# Patient Record
Sex: MALE | Race: WHITE | HISPANIC OR LATINO | Employment: FULL TIME | URBAN - METROPOLITAN AREA
[De-identification: names, ages, dates, MRNs, and addresses within clinical notes are randomized per-mention and may not be internally consistent; named-entity substitution may affect disease eponyms.]

---

## 2017-10-10 ENCOUNTER — APPOINTMENT (EMERGENCY)
Dept: RADIOLOGY | Facility: HOSPITAL | Age: 27
End: 2017-10-10

## 2017-10-10 ENCOUNTER — HOSPITAL ENCOUNTER (EMERGENCY)
Facility: HOSPITAL | Age: 27
Discharge: HOME | End: 2017-10-10
Attending: EMERGENCY MEDICINE

## 2017-10-10 VITALS
BODY MASS INDEX: 27.74 KG/M2 | WEIGHT: 183 LBS | OXYGEN SATURATION: 97 % | SYSTOLIC BLOOD PRESSURE: 113 MMHG | DIASTOLIC BLOOD PRESSURE: 75 MMHG | RESPIRATION RATE: 16 BRPM | TEMPERATURE: 98.2 F | HEART RATE: 75 BPM | HEIGHT: 68 IN

## 2017-10-10 DIAGNOSIS — R07.81 RIB PAIN ON LEFT SIDE: Primary | ICD-10-CM

## 2017-10-10 DIAGNOSIS — D17.1 LIPOMA OF TORSO: ICD-10-CM

## 2017-10-10 DIAGNOSIS — M54.50 ACUTE LEFT-SIDED LOW BACK PAIN WITHOUT SCIATICA: ICD-10-CM

## 2017-10-10 PROCEDURE — 71101 X-RAY EXAM UNILAT RIBS/CHEST: CPT

## 2017-10-10 PROCEDURE — 99283 EMERGENCY DEPT VISIT LOW MDM: CPT

## 2017-10-10 RX ORDER — NAPROXEN 250 MG/1
500 TABLET ORAL ONCE
Status: COMPLETED | OUTPATIENT
Start: 2017-10-10 | End: 2017-10-10

## 2017-10-10 RX ORDER — NAPROXEN 500 MG/1
500 TABLET ORAL 2 TIMES DAILY PRN
Qty: 20 TABLET | Refills: 0 | Status: SHIPPED | OUTPATIENT
Start: 2017-10-10 | End: 2018-03-28

## 2017-10-10 RX ADMIN — NAPROXEN 500 MG: 250 TABLET ORAL at 14:14

## 2017-10-10 NOTE — ED PROVIDER NOTES
History  Chief Complaint   Patient presents with    Back Pain     left sided back pain that worsens with movement and position changes x 1 week  pt also has a soft lump to the area below the right axilla       History provided by:  Patient and relative  Back Pain   Location:  Lumbar spine (left flankm)  Quality:  Aching  Radiates to:  Does not radiate  Pain severity:  Moderate  Pain is:  Same all the time  Onset quality:  Gradual  Duration:  1 week  Timing:  Constant  Progression:  Worsening  Chronicity:  New  Context comment:  1 week ago, was drinking, unsure what happened, next morning back hurt, has been hurting since  Relieved by:  Lying down and being still  Worsened by:  Twisting and bending  Ineffective treatments:  None tried  Associated symptoms: no abdominal pain, no chest pain, no dysuria, no fever, no headaches, no leg pain, no numbness, no tingling and no weakness    Associated symptoms comment:  Noticed a "lump" over right flank  Non tender, freely mobile        Prior to Admission Medications   Prescriptions Last Dose Informant Patient Reported? Taking? butalbital-acetaminophen-caffeine (FIORICET) -40 mg per tablet   No No   Sig: Take 1 tablet by mouth every 4 (four) hours as needed for headaches for up to 20 doses  Facility-Administered Medications: None       History reviewed  No pertinent past medical history  History reviewed  No pertinent surgical history  History reviewed  No pertinent family history  I have reviewed and agree with the history as documented  Social History   Substance Use Topics    Smoking status: Current Every Day Smoker     Packs/day: 0 25     Types: Cigarettes    Smokeless tobacco: Never Used    Alcohol use Yes      Comment: occasional        Review of Systems   Constitutional: Negative for activity change, chills, diaphoresis and fever  HENT: Negative for congestion, sinus pressure and sore throat      Eyes: Negative for pain and visual disturbance  Respiratory: Negative for cough, chest tightness, shortness of breath, wheezing and stridor  Cardiovascular: Negative for chest pain and palpitations  Gastrointestinal: Negative for abdominal distention, abdominal pain, constipation, diarrhea, nausea and vomiting  Genitourinary: Negative for dysuria and frequency  Musculoskeletal: Positive for back pain  Negative for neck pain and neck stiffness  Skin: Negative for rash  Neurological: Negative for dizziness, tingling, speech difficulty, weakness, light-headedness, numbness and headaches  Physical Exam  ED Triage Vitals   Temperature Pulse Respirations Blood Pressure SpO2   10/10/17 1350 10/10/17 1348 10/10/17 1348 10/10/17 1348 10/10/17 1348   98 2 °F (36 8 °C) 75 16 113/75 97 %      Temp Source Heart Rate Source Patient Position - Orthostatic VS BP Location FiO2 (%)   10/10/17 1348 10/10/17 1348 10/10/17 1348 10/10/17 1348 --   Oral Monitor Sitting Right arm       Pain Score       10/10/17 1348       7           Physical Exam   Constitutional: He is oriented to person, place, and time  He appears well-developed  No distress  HENT:   Head: Normocephalic and atraumatic  Eyes: Pupils are equal, round, and reactive to light  Neck: Normal range of motion  Neck supple  No tracheal deviation present  Cardiovascular: Normal rate, regular rhythm, normal heart sounds and intact distal pulses  No murmur heard  Pulmonary/Chest: Effort normal and breath sounds normal  No stridor  No respiratory distress  Abdominal: Soft  He exhibits no distension  There is no tenderness  There is no rebound and no guarding  Musculoskeletal: Normal range of motion  left flank mild tenderness, mild overlying contusion, rib tenderness along ribs 9-11    On patient's right flank he has a freely mobile fatty subcutaneous mass consistent with a lipoma, nontender no overlying skin changes   Neurological: He is alert and oriented to person, place, and time    Skin: Skin is warm and dry  He is not diaphoretic  No erythema  No pallor  Psychiatric: He has a normal mood and affect  Vitals reviewed  ED Medications  Medications   naproxen (NAPROSYN) tablet 500 mg (500 mg Oral Given 10/10/17 1414)       Diagnostic Studies  Labs Reviewed - No data to display    XR ribs with pa chest min 3 views LEFT   ED Interpretation   No acute pathology          Procedures  Procedures      Phone Contacts  ED Phone Contact    ED Course  ED Course                                MDM  Number of Diagnoses or Management Options  Acute left-sided low back pain without sciatica: new and requires workup  Rib pain on left side: new and requires workup  Diagnosis management comments: 72-year-old male lower back pain left-sided rib pain, started after a presumed fall after intoxicated night about 1 week ago  Does not fully recall the events due to alcohol intoxication, mild overlying contusion on left flank, no abdominal tenderness to deep palpation, doubt serious solid organs and intra-abdominal injury, will start with x-rays of the left ribs, will treat with NSAIDs  As per the lipoma discovered on physical examination, is likely has been there for much longer than the patient has realized, freely mobile no evidence of more sinister pathology than just a simple lipoma will have him follow with outpatient surgery for discussion of potential removal       Amount and/or Complexity of Data Reviewed  Tests in the radiology section of CPT®: ordered and reviewed  Review and summarize past medical records: yes  Independent visualization of images, tracings, or specimens: yes      CritCare Time    Disposition  Final diagnoses:   Rib pain on left side   Acute left-sided low back pain without sciatica   Lipoma of torso - Right flank     ED Disposition     ED Disposition Condition Comment    Discharge  Maykel Ortiz discharge to home/self care      Condition at discharge: Good        Follow-up Information     Follow up With Specialties Details Why [address], DO General Surgery Call in 1 day To arrange for the next available appointment to discuss possible removal of lipoma if indicated Erin CORADO  [address] [place] [address]          Patient's Medications   Discharge Prescriptions    NAPROXEN (NAPROSYN) 500 MG TABLET    Take 1 tablet by mouth 2 (two) times a day as needed for mild pain       Start Date: 10/10/2017End Date: --       Order Dose: 500 mg       Quantity: 20 tablet    Refills: 0     No discharge procedures on file      ED Provider  Electronically Signed by       Fauzia Gaxiola DO  10/10/17 100 Geo Soriano DO  10/10/17 [id number]

## 2017-10-10 NOTE — DISCHARGE INSTRUCTIONS
Lipoma   GENERAL INFORMATION:   What is a lipoma? A lipoma is a lump made up of fat cells  It is most often found just under your skin on your shoulders, back, or neck, but can be found in other areas of your body  Multiple lipomas found in different areas of your body is called lipomatosis  Lipomas normally grow very slowly and rarely turn into cancer  What increases my risk of a lipoma? The exact cause of a lipoma is not known  Single lipomas are more common in women  Men are more likely to have lipomatosis  The following may increase your risk of getting a lipoma or lipomatosis:  · Family history of lipoma     · Certain medical conditions, such as liver disease, or problems controlling your blood sugar    · Obesity    · A blunt blow or injury to your body  What are the signs and symptoms of a lipoma? Lipomas can occur anywhere in your body and cause signs and symptoms depending on where they occur  Most often, you have a soft, round, movable lump just under your skin  The lump may be painful, but this is not common  How is a lipoma diagnosed? Your caregiver will examine you  He may feel the area around your lipoma  Tell your caregiver about any signs and symptoms you have  You may need any of the following:  · Ultrasound: An ultrasound uses sound waves to show pictures on a monitor  An ultrasound may be done to look at your lipoma and surrounding tissue  · X-ray:  An x-ray may be taken to check for lipomas in your muscles and other areas of your body  · CT scan: This test is also called a CAT scan  An x-ray machine uses a computer to take pictures of your lipoma and nearby tissue  You may be given a dye before the pictures are taken to help caregivers see the pictures better  Tell the caregiver if you have ever had an allergic reaction to contrast dye  · MRI:  This scan uses powerful magnets and a computer to take pictures of your lipoma and nearby tissue   You may be given dye to help the pictures show up better  Tell the caregiver if you have ever had an allergic reaction to contrast dye  Do not enter the MRI room with anything metal  Metal can cause serious injury  Tell the caregiver if you have any metal in or on your body  · Biopsy:  During this procedure, a small amount of tissue is removed from your lipoma  The tissue sample will be sent to a lab for tests  How is a lipoma treated? You may need treatment if your lipoma grows and causes symptoms such as pain  You may choose to have your lipoma treated if you do not like how it looks  Treatment may include any of the following:  · Steroid injections: This is given as a shot into your lipoma to help it shrink  · Liposuction:  During this procedure, your caregiver will use a syringe with a needle to remove your lipoma  He may also insert a scope and tools through a small incision  A scope is a thin, flexible tube with a light and tiny camera on the end  This will help him see and remove your lipoma  · Surgical removal:  Your caregiver will remove your lipoma through an incision in your skin  Anesthesia medicine will be used to numb the surgery site  A drain may be put into your skin to remove extra blood or fluid from your surgery area  The incision may be closed with stitches and a bandage may cover your wound  What are the risks of a lipoma? · Treatment for your lipoma may cause pain, swelling, and bruising  Liposuction may cause dimpling or color changes in your skin  Surgical removal of your lipoma may cause a scar  With surgery, a seroma (pocket of fluid) may form in nearby tissue or organs  Your nerves may be damaged and cause numbness or tingling in your skin  Your muscles may also be injured, and you may bleed more than expected or get an infection  You may need to have more than one treatment for your lipoma  Even with treatment, your lipoma may not be completely removed, and it may increase in size again      · Without treatment, your lipoma may become large and painful  A lipoma in your bowel may block bowel movements  It may also injure nearby tissue causing a hemorrhage (heavy bleeding)  Lipomas in your neck and throat may cause you to choke, have trouble breathing, and be life-threatening  When should I contact my caregiver? Contact your caregiver if:  · You have blood in your bowel movement  · Your lipoma increases in size  · Your lipoma is painful or you have pain in the area of your lipoma  · You have questions or concerns about your condition or care  When should I seek immediate care? Seek care immediately or call 911 if:  · You feel a lump in your throat or have trouble swallowing  · You suddenly have trouble breathing  CARE AGREEMENT:   You have the right to help plan your care  Learn about your health condition and how it may be treated  Discuss treatment options with your caregivers to decide what care you want to receive  You always have the right to refuse treatment  The above information is an  only  It is not intended as medical advice for individual conditions or treatments  Talk to your doctor, nurse or pharmacist before following any medical regimen to see if it is safe and effective for you  © 2014 [address] is for End User's use only and may not be sold, redistributed or otherwise used for commercial purposes  All illustrations and images included in CareNotes® are the copyrighted property of [employer]  or [address]  Acute Low Back Pain, Ambulatory Care   GENERAL INFORMATION:   Acute low back pain  is discomfort in your lower back area that lasts for less than 12 weeks  The word acute is used to describe pain that starts suddenly, worsens quickly, and lasts for a short time    Common symptoms include the following:   · Back stiffness or spasms    · Pain down the back or side of one leg    · Holding yourself in an unusual position or posture to decrease your back pain    · Not being able to find a sitting position that is comfortable    · Slow increase in your pain for 24 to 48 hours after you stress your back    · Tenderness on your lower back or severe pain when you move your back  Seek immediate care for the following symptoms:   · Severe pain    · Sudden stiffness and heaviness in both buttocks down to both legs    · Numbness or weakness in one leg, or pain in both legs    · Numbness in your genital area or across your lower back    · Unable to control your urine or bowel movements  Treatment for acute low back pain  may include any of the following:  · Medicines:      ¨ NSAIDs  help decrease swelling and pain or fever  This medicine is available with or without a doctor's order  NSAIDs can cause stomach bleeding or kidney problems in certain people  If you take blood thinner medicine, always ask your healthcare provider if NSAIDs are safe for you  Always read the medicine label and follow directions  ¨ Muscle relaxers  help decrease muscle spasms pain  ¨ Prescription pain medicine  may be given  Ask how to take this medicine safely  · Surgery  may be needed if your pain is severe and other treatments do not work  Surgery may be needed for conditions of the lumbar spine, such as herniated disc or spinal stenosis  Manage your symptoms:   · Sleep on a firm mattress  If you do not have a firm mattress, have someone move your mattress to the floor for a few days  A piece of plywood under your mattress can also help make it firmer  · Apply ice  on your lower back for 15 to 20 minutes every hour or as directed  Use an ice pack, or put crushed ice in a plastic bag  Cover it with a towel  Ice helps prevent tissue damage and decreases swelling and pain  You can alternate ice and heat  · Apply heat  on your lower back for 20 to 30 minutes every 2 hours for as many days as directed   Heat helps decrease pain and muscle spasms  · Go to physical therapy  A physical therapist teaches you exercises to help improve movement and strength, and to decrease pain  Prevent acute low back pain:   · Use proper body mechanics  ¨ Bend at the hips and knees when you  objects  Do not bend from the waist  Use your leg muscles as you lift the load  Do not use your back  Keep the object close to your chest as you lift it  Try not to twist or lift anything above your waist     ¨ Change your position often when you stand for long periods of time  Rest one foot on a small box or footrest, and then switch to the other foot often  ¨ Try not to sit for long periods of time  When you do, sit in a straight-backed chair with your feet flat on the floor  Never reach, pull, or push while you are sitting  · Exercise regularly  Warm up before you exercise  Do exercises that strengthen your back muscles  Ask about the best exercise plan for you  · Maintain a healthy weight  Ask your healthcare provider how much you should weigh  Ask him to help you create a weight loss plan if you are overweight  Follow up with your healthcare provider as directed:  Return for a follow-up visit if you still have pain after 1 to 3 weeks of treatment  You may need to visit an orthopedist if your back pain lasts more than 6 to 12 weeks  Write down your questions so you remember to ask them during your visits  CARE AGREEMENT:   You have the right to help plan your care  Learn about your health condition and how it may be treated  Discuss treatment options with your caregivers to decide what care you want to receive  You always have the right to refuse treatment  The above information is an  only  It is not intended as medical advice for individual conditions or treatments  Talk to your doctor, nurse or pharmacist before following any medical regimen to see if it is safe and effective for you    © 2014 [address] is for End User's use only and may not be sold, redistributed or otherwise used for commercial purposes  All illustrations and images included in CareNotes® are the copyrighted property of [employer]  or [employer]  Rib Contusion   WHAT YOU NEED TO KNOW:   A rib contusion is a bruise on one or more of your ribs  DISCHARGE INSTRUCTIONS:   Return to the emergency department if:   · You have increased chest pain  · You have shortness of breath  · You start to cough up blood  · Your pain does not improve with pain medicine  Contact your healthcare provider if:   · You have a cough  · You have a fever  · You have questions or concerns about your condition or care  Medicines: You may need any of the following:  · NSAIDs , such as ibuprofen, help decrease swelling, pain, and fever  This medicine is available with or without a doctor's order  NSAIDs can cause stomach bleeding or kidney problems in certain people  If you take blood thinner medicine, always ask if NSAIDs are safe for you  Always read the medicine label and follow directions  Do not give these medicines to children under 10months of age without direction from your child's healthcare provider  · Prescription pain medicine  may be given  Ask how to take this medicine safely  · Take your medicine as directed  Contact your healthcare provider if you think your medicine is not helping or if you have side effects  Tell him of her if you are allergic to any medicine  Keep a list of the medicines, vitamins, and herbs you take  Include the amounts, and when and why you take them  Bring the list or the pill bottles to follow-up visits  Carry your medicine list with you in case of an emergency  Deep breathing:   · To help prevent pneumonia, take 10 deep breaths every hour, even when you wake up during the night  Brace your ribs with your hands or a pillow while you take deep breaths or cough   This will help decrease your pain  · You may need to use an incentive spirometer to help you take deeper breaths  Put the plastic piece into your mouth and take a very deep breath  Hold your breath as long as you can  Then let out your breath  Do this 10 times in a row every hour while you are awake  Rest:  Rest your ribs to decrease swelling and allow the injury to heal faster  Avoid activities that may cause more pain or damage to your ribs  As your pain decreases, begin movements slowly  Ice:  Ice helps decrease swelling and pain  Ice may also help prevent tissue damage  Use an ice pack or put crushed ice in a plastic bag  Cover it with a towel and place it on your bruised area for 15 to 20 minutes every hour as directed  Follow up with your healthcare provider as directed:  Write down your questions so you remember to ask them during your visits  © 2017 [address] Information is for End User's use only and may not be sold, redistributed or otherwise used for commercial purposes  All illustrations and images included in CareNotes® are the copyrighted property of [employer]  or [employer]  The above information is an  only  It is not intended as medical advice for individual conditions or treatments  Talk to your doctor, nurse or pharmacist before following any medical regimen to see if it is safe and effective for you

## 2018-03-13 ENCOUNTER — APPOINTMENT (EMERGENCY)
Dept: RADIOLOGY | Facility: HOSPITAL | Age: 28
End: 2018-03-13

## 2018-03-13 ENCOUNTER — HOSPITAL ENCOUNTER (EMERGENCY)
Facility: HOSPITAL | Age: 28
Discharge: HOME | End: 2018-03-13
Attending: EMERGENCY MEDICINE | Admitting: EMERGENCY MEDICINE

## 2018-03-13 VITALS
SYSTOLIC BLOOD PRESSURE: 118 MMHG | RESPIRATION RATE: 16 BRPM | BODY MASS INDEX: 25.54 KG/M2 | WEIGHT: 168 LBS | DIASTOLIC BLOOD PRESSURE: 71 MMHG | OXYGEN SATURATION: 99 % | TEMPERATURE: 98.6 F | HEART RATE: 67 BPM

## 2018-03-13 DIAGNOSIS — S39.92XA INJURY OF BACK, INITIAL ENCOUNTER: Primary | ICD-10-CM

## 2018-03-13 PROCEDURE — 72070 X-RAY EXAM THORAC SPINE 2VWS: CPT

## 2018-03-13 PROCEDURE — 99283 EMERGENCY DEPT VISIT LOW MDM: CPT

## 2018-03-13 NOTE — ED PROVIDER NOTES
History  Chief Complaint   Patient presents with    Back Injury     Pt presents to the ED with lower back pain onset this morning after falling  pt reports going downt he stairs when he slipped and landed on his bottom and back  23 yo M with no significant PMH who presents today for evaluation of back pain x 1 day  This morning he was running down his stairs, states he was wearing slippers, when he slipped on the bottom of the stairs and landed on his lower back on a concrete floor  He denies any head injury or LOC  He describes pain across his mid-lower back that feels tight and occasionally sharp  It is worse with lying flat and bending forward  Nothing makes it better  He has not attempted any alleviating factors or medications  His pain is 6/10  He denies CP, SOB, abd pain, n/v/d, hematuria, bowel/blader incontinence, saddles anesthesias, total leg numbness or weakness, bruising  No head injury, LOC or neck pain  He denies any prior fracture or injury to his back  Prior to Admission Medications   Prescriptions Last Dose Informant Patient Reported? Taking? butalbital-acetaminophen-caffeine (FIORICET) -40 mg per tablet   No No   Sig: Take 1 tablet by mouth every 4 (four) hours as needed for headaches for up to 20 doses  naproxen (NAPROSYN) 500 mg tablet   No No   Sig: Take 1 tablet by mouth 2 (two) times a day as needed for mild pain      Facility-Administered Medications: None       History reviewed  No pertinent past medical history  History reviewed  No pertinent surgical history  History reviewed  No pertinent family history  I have reviewed and agree with the history as documented  Social History   Substance Use Topics    Smoking status: Current Every Day Smoker     Packs/day: 0 25     Types: Cigarettes    Smokeless tobacco: Never Used    Alcohol use Yes      Comment: occasional        Review of Systems   Constitutional: Negative for chills and fever  Respiratory: Negative for shortness of breath  Cardiovascular: Negative for chest pain  Gastrointestinal: Negative for abdominal pain, nausea and vomiting  Genitourinary: Negative for dysuria, frequency and hematuria  Musculoskeletal: Positive for back pain  Negative for neck pain and neck stiffness  Skin: Negative for color change and wound  Neurological: Negative for dizziness, syncope, weakness, light-headedness, numbness and headaches  Physical Exam  ED Triage Vitals [03/13/18 1403]   Temperature Pulse Respirations Blood Pressure SpO2   98 6 °F (37 °C) 67 16 118/71 99 %      Temp Source Heart Rate Source Patient Position - Orthostatic VS BP Location FiO2 (%)   Oral Monitor Sitting Right arm --      Pain Score       6           Orthostatic Vital Signs  Vitals:    03/13/18 1403   BP: 118/71   Pulse: 67   Patient Position - Orthostatic VS: Sitting       Physical Exam   Constitutional: He is oriented to person, place, and time  He appears well-developed and well-nourished  Non-toxic appearance  He does not have a sickly appearance  He does not appear ill  No distress  Well appearing male   HENT:   Head: Normocephalic and atraumatic  Right Ear: Hearing and external ear normal    Left Ear: Hearing and external ear normal    Nose: Nose normal    Mouth/Throat: Uvula is midline and oropharynx is clear and moist    Eyes: Conjunctivae and EOM are normal  Pupils are equal, round, and reactive to light  Neck: Normal range of motion  Neck supple  Cardiovascular: Normal rate, regular rhythm and normal heart sounds  Exam reveals no gallop and no friction rub  No murmur heard  Pulmonary/Chest: Effort normal and breath sounds normal  No respiratory distress  He has no decreased breath sounds  He has no wheezes  He has no rhonchi  He has no rales  Musculoskeletal: Normal range of motion  Thoracic back: He exhibits tenderness and bony tenderness   He exhibits normal range of motion, no swelling, no edema, no deformity, no laceration, no spasm and normal pulse  Back:    No bruising or color change to trunk  Normal ROM of lower extremities with 5/5 strength, normal DF/PF  Distal sensation is intact  Neg SLR bilaterally  Patellar reflexes 2+ bilaterally  Normal gait  Neurological: He is alert and oriented to person, place, and time  He has normal strength  No sensory deficit  GCS eye subscore is 4  GCS verbal subscore is 5  GCS motor subscore is 6  Skin: Skin is warm and dry  Capillary refill takes less than 2 seconds  No rash noted  He is not diaphoretic  Psychiatric: He has a normal mood and affect  Nursing note and vitals reviewed  ED Medications  Medications - No data to display    Diagnostic Studies  Results Reviewed     None                 XR thoracic spine 2 views   ED Interpretation by Paula Schneider PA-C (03/13 5460)   No acute findings      Final Result by Kosta Cuellar MD (03/13 1366)      No fracture or subluxation  Workstation performed: [id number]                    Procedures  Procedures       Phone Contacts  ED Phone Contact    ED Course  ED Course                                MDM  Number of Diagnoses or Management Options  Injury of back, initial encounter: new and requires workup  Diagnosis management comments:   23 yo M with LBP after a fall  He is declining pain medications while in the ED  XR thoracic spine with no acute findings  I discussed supportive care: ice, NSAIDs or tylenol  F/u given  RTED precautions discussed  Patient verbalizes understanding and agrees with plan  Work note was provided         Amount and/or Complexity of Data Reviewed  Tests in the radiology section of CPT®: ordered and reviewed  Independent visualization of images, tracings, or specimens: yes    Patient Progress  Patient progress: stable    CritCare Time    Disposition  Final diagnoses:   Injury of back, initial encounter     Time reflects when diagnosis was documented in both MDM as applicable and the Disposition within this note     Time User Action Codes Description Comment    3/13/2018  3:11 PM Ruben Grossman Add [[id number]] Injury of back, initial encounter       ED Disposition     ED Disposition Condition Comment    Discharge  Noah Asiya discharge to home/self care  Condition at discharge: Good        Follow-up Information     Follow up With Specialties Details Why Contact Info Additional [address] [facility]  Schedule an appointment as soon as possible for a visit  [address]  [address] [facility] Schedule an appointment as soon as possible for a visit If symptoms worsen [address] Emergency Department Emergency Medicine  If symptoms worsen - BLOOD IN [address] [place] [address]  [facility] ED, [address], [facility], [place], [address]        Discharge Medication List as of 3/13/2018  3:13 PM      CONTINUE these medications which have NOT CHANGED    Details   butalbital-acetaminophen-caffeine (FIORICET) -40 mg per tablet Take 1 tablet by mouth every 4 (four) hours as needed for headaches for up to 20 doses  , Starting 5/21/2016, Until Discontinued, Print      naproxen (NAPROSYN) 500 mg tablet Take 1 tablet by mouth 2 (two) times a day as needed for mild pain, Starting Tue 10/10/2017, Print           No discharge procedures on file      ED Provider  Electronically Signed by           Francisco J Singh PA-C  03/13/18 4041

## 2018-03-13 NOTE — DISCHARGE INSTRUCTIONS
APPLY ICE TO AREA  YOU MAY TAKE MOTRIN OR TYLENOL FOR PAIN  STRETCH YOUR BACK  ER RETURN FOR WORSENING SYMPTOMS, BLOOD IN URINE  Back Pain   WHAT YOU NEED TO KNOW:   Back pain is common  It can be caused by many conditions, such as arthritis or the breakdown of spinal discs  Your risk for back pain is increased by injuries, lack of activity, or repeated bending and twisting  You may feel sore or stiff on one or both sides of your back  The pain may spread to your buttocks or thighs  DISCHARGE INSTRUCTIONS:   Medicines:   · NSAIDs  help decrease swelling and pain  This medicine is available with or without a doctor's order  NSAIDs can cause stomach bleeding or kidney problems in certain people  If you take blood thinner medicine, always ask your healthcare provider if NSAIDs are safe for you  Always read the medicine label and follow directions  · Acetaminophen  decreases pain  It is available without a doctor's order  Ask how much to take and how often to take it  Follow directions  Acetaminophen can cause liver damage if not taken correctly  · Prescription pain medicine  may be given  Ask your healthcare provider how to take this medicine safely  · Take your medicine as directed  Contact your healthcare provider if you think your medicine is not helping or if you have side effects  Tell him or her if you are allergic to any medicine  Keep a list of the medicines, vitamins, and herbs you take  Include the amounts, and when and why you take them  Bring the list or the pill bottles to follow-up visits  Carry your medicine list with you in case of an emergency  Follow up with your healthcare provider in 2 weeks, or as directed:  Write down your questions so you remember to ask them during your visits  How to manage your back pain:   · Apply ice  on your back or affected area for 15 to 20 minutes every hour or as directed  Use an ice pack, or put crushed ice in a plastic bag   Cover it with a towel  Ice helps prevent tissue damage and decreases pain  · Apply heat  on your back or affected area for 20 to 30 minutes every 2 hours for as many days as directed  Heat helps decrease pain and muscle spasms  · Stay active  as much as you can without causing more pain  Bed rest could make your back pain worse  Avoid heavy lifting until your pain is gone  Return to the emergency department if:   · You have pain, numbness, or weakness in one or both legs  · Your pain becomes so severe that you cannot walk  · You cannot control your urine or bowel movements  · You have severe back pain with chest pain  · You have severe back pain, nausea, and vomiting  · You have severe back pain that spreads to your side or genital area  Contact your healthcare provider if:   · You have back pain that does not get better with rest and pain medicine  · You have a fever  · You have pain that worsens when you are on your back or when you rest     · You have pain that worsens when you cough or sneeze  · You lose weight without trying  · You have questions or concerns about your condition or care  © 2017 [facility] [facility] Information is for End User's use only and may not be sold, redistributed or otherwise used for commercial purposes  All illustrations and images included in CareNotes® are the copyrighted property of A D A M , Inc  or [employer]  The above information is an  only  It is not intended as medical advice for individual conditions or treatments  Talk to your doctor, nurse or pharmacist before following any medical regimen to see if it is safe and effective for you

## 2018-03-19 ENCOUNTER — APPOINTMENT (EMERGENCY)
Dept: CT IMAGING | Facility: HOSPITAL | Age: 28
End: 2018-03-19

## 2018-03-19 ENCOUNTER — HOSPITAL ENCOUNTER (EMERGENCY)
Facility: HOSPITAL | Age: 28
Discharge: HOME | End: 2018-03-19
Attending: EMERGENCY MEDICINE

## 2018-03-19 VITALS
OXYGEN SATURATION: 100 % | HEART RATE: 67 BPM | RESPIRATION RATE: 14 BRPM | SYSTOLIC BLOOD PRESSURE: 103 MMHG | TEMPERATURE: 98.3 F | DIASTOLIC BLOOD PRESSURE: 70 MMHG

## 2018-03-19 DIAGNOSIS — R51.9 HEADACHE: Primary | ICD-10-CM

## 2018-03-19 DIAGNOSIS — D17.1 LIPOMA OF CHEST WALL: ICD-10-CM

## 2018-03-19 PROCEDURE — 99284 EMERGENCY DEPT VISIT MOD MDM: CPT

## 2018-03-19 PROCEDURE — 70450 CT HEAD/BRAIN W/O DYE: CPT

## 2018-03-19 PROCEDURE — 96375 TX/PRO/DX INJ NEW DRUG ADDON: CPT

## 2018-03-19 PROCEDURE — 96374 THER/PROPH/DIAG INJ IV PUSH: CPT

## 2018-03-19 PROCEDURE — 96361 HYDRATE IV INFUSION ADD-ON: CPT

## 2018-03-19 RX ORDER — METOCLOPRAMIDE HYDROCHLORIDE 5 MG/ML
10 INJECTION INTRAMUSCULAR; INTRAVENOUS ONCE
Status: COMPLETED | OUTPATIENT
Start: 2018-03-19 | End: 2018-03-19

## 2018-03-19 RX ORDER — DIPHENHYDRAMINE HYDROCHLORIDE 50 MG/ML
25 INJECTION INTRAMUSCULAR; INTRAVENOUS ONCE
Status: COMPLETED | OUTPATIENT
Start: 2018-03-19 | End: 2018-03-19

## 2018-03-19 RX ORDER — KETOROLAC TROMETHAMINE 30 MG/ML
15 INJECTION, SOLUTION INTRAMUSCULAR; INTRAVENOUS ONCE
Status: COMPLETED | OUTPATIENT
Start: 2018-03-19 | End: 2018-03-19

## 2018-03-19 RX ADMIN — KETOROLAC TROMETHAMINE 15 MG: 30 INJECTION, SOLUTION INTRAMUSCULAR at 15:54

## 2018-03-19 RX ADMIN — METOCLOPRAMIDE 10 MG: 5 INJECTION, SOLUTION INTRAMUSCULAR; INTRAVENOUS at 15:52

## 2018-03-19 RX ADMIN — SODIUM CHLORIDE 1000 ML: 0.9 INJECTION, SOLUTION INTRAVENOUS at 15:50

## 2018-03-19 RX ADMIN — DIPHENHYDRAMINE HYDROCHLORIDE 25 MG: 50 INJECTION, SOLUTION INTRAMUSCULAR; INTRAVENOUS at 15:50

## 2018-03-19 NOTE — DISCHARGE INSTRUCTIONS
Acute Headache   WHAT YOU NEED TO KNOW:   An acute headache is pain or discomfort that starts suddenly and gets worse quickly  You may have an acute headache only when you feel stress or eat certain foods  Other acute headache pain can happen every day, and sometimes several times a day  DISCHARGE INSTRUCTIONS:   Return to the emergency department if:   · You have severe pain  · You have numbness or weakness on one side of your face or body  · You have a headache that occurs after a blow to the head, a fall, or other trauma  · You have a headache, are forgetful or confused, or have trouble speaking  · You have a headache, stiff neck, and a fever  Contact your healthcare provider if:   · You have a constant headache and are vomiting  · You have a headache each day that does not get better, even after treatment  · You have changes in your headaches, or new symptoms that occur when you have a headache  · You have questions or concerns about your condition or care  Medicines: You may need any of the following:  · Prescription pain medicine  may be given  The medicine your healthcare provider recommends will depend on the kind of headaches you have  You will need to take prescription headache medicines as directed to prevent a problem called rebound headache  These headaches happen with regular use of pain relievers for headache disorders  · NSAIDs , such as ibuprofen, help decrease swelling, pain, and fever  This medicine is available with or without a doctor's order  NSAIDs can cause stomach bleeding or kidney problems in certain people  If you take blood thinner medicine, always ask your healthcare provider if NSAIDs are safe for you  Always read the medicine label and follow directions  · Acetaminophen  decreases pain and fever  It is available without a doctor's order  Ask how much to take and how often to take it  Follow directions   Read the labels of all other medicines you are using to see if they also contain acetaminophen, or ask your doctor or pharmacist  Acetaminophen can cause liver damage if not taken correctly  Do not use more than 3 grams (3,000 milligrams) total of acetaminophen in one day  · Antidepressants  may be given for some kinds of headaches  · Take your medicine as directed  Contact your healthcare provider if you think your medicine is not helping or if you have side effects  Tell him or her if you are allergic to any medicine  Keep a list of the medicines, vitamins, and herbs you take  Include the amounts, and when and why you take them  Bring the list or the pill bottles to follow-up visits  Carry your medicine list with you in case of an emergency  Manage your symptoms:   · Apply heat or ice  on the headache area  Use a heat or ice pack  For an ice pack, you can also put crushed ice in a plastic bag  Cover the pack or bag with a towel before you apply it to your skin  Ice and heat both help decrease pain, and heat also helps decrease muscle spasms  Apply heat for 20 to 30 minutes every 2 hours  Apply ice for 15 to 20 minutes every hour  Apply heat or ice for as long and for as many days as directed  You may alternate heat and ice  · Relax your muscles  Lie down in a comfortable position and close your eyes  Relax your muscles slowly  Start at your toes and work your way up your body  · Keep a record of your headaches  Write down when your headaches start and stop  Include your symptoms and what you were doing when the headache began  Record what you ate or drank for 24 hours before the headache started  Describe the pain and where it hurts  Keep track of what you did to treat your headache and if it worked  Prevent an acute headache:   · Avoid anything that triggers an acute headache  Examples include exposure to chemicals, going to high altitude, or not getting enough sleep  Create a regular sleep routine   Go to sleep at the same time and wake up at the same time each day  Do not use electronic devices before bedtime  These may trigger a headache or prevent you from sleeping well  · Do not smoke  Nicotine and other chemicals in cigarettes and cigars can trigger an acute headache or make it worse  Ask your healthcare provider for information if you currently smoke and need help to quit  E-cigarettes or smokeless tobacco still contain nicotine  Talk to your healthcare provider before you use these products  · Limit alcohol as directed  Alcohol can trigger an acute headache or make it worse  If you have cluster headaches, do not drink alcohol during an episode  For other types of headaches, ask your healthcare provider if it is safe for you to drink alcohol  Ask how much is safe for you to drink, and how often  · Exercise as directed  Exercise can reduce tension and help with headache pain  Aim for 30 minutes of physical activity on most days of the week  Your healthcare provider can help you create an exercise plan  · Eat a variety of healthy foods  Healthy foods include fruits, vegetables, low-fat dairy products, lean meats, fish, whole grains, and cooked beans  Your healthcare provider or dietitian can help you create meals plans if you need to avoid foods that trigger headaches  Follow up with your healthcare provider as directed:  Bring your headache record with you when you see your healthcare provider  Write down your questions so you remember to ask them during your visits  © 2017 [address] [facility] Information is for End User's use only and may not be sold, redistributed or otherwise used for commercial purposes  All illustrations and images included in CareNotes® are the copyrighted property of A D A M , Inc  or [employer]  The above information is an  only  It is not intended as medical advice for individual conditions or treatments   Talk to your doctor, nurse or pharmacist before following any medical regimen to see if it is safe and effective for you  Lipoma   WHAT YOU NEED TO KNOW:   What is a lipoma? A lipoma is a benign tumor of fatty tissue  Benign means the tumor is not cancerous  The cause of lipomas is unknown  Some types of lipomas may run in families  What are the signs of a lipoma? A lipoma is a round, soft, rubbery lump of tissue that grows very slowly under the skin  Lipomas can form anywhere in your body, but are usually found on the back, shoulders, neck, abdomen, arms, buttocks, and thighs  Lipomas move around underneath your skin when you press on them  How is a lipoma diagnosed? Your healthcare provider will ask you when you noticed the lipoma, and if it has changed in size  He will also ask if you have any other symptoms  Your healthcare provider will examine your lipoma  He may also take a sample of tissue and send it to a lab for tests  This procedure is called a biopsy  You may need imaging tests, such as an ultrasound or MRI, if the diagnosis is unclear or you have pain  How is a lipoma treated? You may not need any treatment if the lipoma does not bother you  Your healthcare provider may recommend regular follow-up visits to check the lipoma for any changes  You may need surgery to remove the lipoma if it is large, painful, or causes other symptoms  This surgery is called an excision  Your healthcare provider will make an incision over the lipoma  He will then remove the lipoma and close the incision with medical tape or stitches  A sample of tissue from the lipoma may be sent to a lab for tests  When should I contact my healthcare provider? · Your lipoma is getting bigger  · Your lipoma causes new or increased pain  · You develop new symptoms  · You have questions or concerns about your condition or care  CARE AGREEMENT:   You have the right to help plan your care  Learn about your health condition and how it may be treated   Discuss treatment options with your caregivers to decide what care you want to receive  You always have the right to refuse treatment  The above information is an  only  It is not intended as medical advice for individual conditions or treatments  Talk to your doctor, nurse or pharmacist before following any medical regimen to see if it is safe and effective for you  © 2017 [address] [employer] Information is for End User's use only and may not be sold, redistributed or otherwise used for commercial purposes  All illustrations and images included in CareNotes® are the copyrighted property of A D A M , Inc  or [employer]

## 2018-03-19 NOTE — ED PROVIDER NOTES
History  Chief Complaint   Patient presents with    Headache - New Onset or New Symptoms     Pt c/o new onset of HA starting today, c/o light sensitivity  Pt also c/o cyst of the right side of his ribcage x3 months   Cyst       24year old male with no significant past medical history presents for evaluation of headache x1 day  He states his headache started approximately 11:00 a m  while he was at work  He states it was worsened by the sun but he denies any photosensitivity while in the emergency department  He describes a throbbing pain in the temporal region that is constant and nonradiating  Symptoms have been improving since onset  He rates his pain a 6/10  He has not attempted any alleviating factors  He denies any associated dizziness, blurred vision, double vision, nausea, vomiting, extremity numbness or weakness, trouble talking or walking, phonophobia  States he does not have a history of similar symptoms or history of migraines  He is not on any thinners  He also states he noticed a lump on the right chest wall which has been there for a few months and is gradually growing in size  He states it is not tender with palpation and there is no drainage from the area  He has no history of similar symptoms  Prior to Admission Medications   Prescriptions Last Dose Informant Patient Reported? Taking? butalbital-acetaminophen-caffeine (FIORICET) -40 mg per tablet   No No   Sig: Take 1 tablet by mouth every 4 (four) hours as needed for headaches for up to 20 doses  naproxen (NAPROSYN) 500 mg tablet   No No   Sig: Take 1 tablet by mouth 2 (two) times a day as needed for mild pain      Facility-Administered Medications: None       History reviewed  No pertinent past medical history  History reviewed  No pertinent surgical history  History reviewed  No pertinent family history  I have reviewed and agree with the history as documented      Social History   Substance Use Topics    Smoking status: Current Every Day Smoker     Packs/day: 0 25     Types: Cigarettes    Smokeless tobacco: Never Used    Alcohol use Yes      Comment: occasional        Review of Systems   Constitutional: Negative for chills and fever  HENT: Negative for congestion, rhinorrhea and sore throat  Eyes: Positive for photophobia  Negative for pain and visual disturbance  Respiratory: Negative for cough, shortness of breath and wheezing  Cardiovascular: Negative for chest pain and palpitations  Gastrointestinal: Negative for abdominal pain, diarrhea, nausea and vomiting  Musculoskeletal: Negative for back pain  Skin: Negative for color change and rash  Lump on chest wall   Neurological: Positive for headaches  Negative for dizziness, syncope, speech difficulty, weakness, light-headedness and numbness  Physical Exam  ED Triage Vitals [03/19/18 1335]   Temperature Pulse Respirations Blood Pressure SpO2   98 3 °F (36 8 °C) 65 18 124/79 100 %      Temp Source Heart Rate Source Patient Position - Orthostatic VS BP Location FiO2 (%)   Oral Monitor Sitting Left arm --      Pain Score       6           Orthostatic Vital Signs  Vitals:    03/19/18 1335 03/19/18 1525 03/19/18 1530 03/19/18 1620   BP: 124/79 98/66 98/66 103/70   Pulse: 65 (!) 47 (!) 52 67   Patient Position - Orthostatic VS: Sitting Lying Lying Lying       Physical Exam   Constitutional: He is oriented to person, place, and time  Vital signs are normal  He appears well-developed and well-nourished  Non-toxic appearance  He does not have a sickly appearance  He does not appear ill  No distress  Well appearing, clear speech, ambulatory in room with steady, normal gait   HENT:   Head: Normocephalic and atraumatic  Right Ear: Hearing, tympanic membrane, external ear and ear canal normal    Left Ear: Hearing, tympanic membrane, external ear and ear canal normal    Nose: No mucosal edema or rhinorrhea     Mouth/Throat: Uvula is midline, oropharynx is clear and moist and mucous membranes are normal  No tonsillar exudate  Eyes: Conjunctivae, EOM and lids are normal  Pupils are equal, round, and reactive to light  Neck: Normal range of motion  Neck supple  Cardiovascular: Normal rate, regular rhythm and normal heart sounds  Exam reveals no gallop and no friction rub  No murmur heard  Pulmonary/Chest: Effort normal and breath sounds normal  No respiratory distress  He has no decreased breath sounds  He has no wheezes  He has no rhonchi  He has no rales  He exhibits mass  He exhibits no tenderness  Musculoskeletal: Normal range of motion  Neurological: He is alert and oriented to person, place, and time  He has normal strength  No cranial nerve deficit or sensory deficit  Coordination and gait normal  GCS eye subscore is 4  GCS verbal subscore is 5  GCS motor subscore is 6  Reflex Scores:       Patellar reflexes are 2+ on the right side and 2+ on the left side  CN II-XII grossly intact  No focal neuro deficits  Extremity strength 5/5 bilaterally with normal ROM  No pronator drift  Sensation intact and equal bilaterally  Normal gait  Normal heel-toe  Skin: Skin is warm and dry  Capillary refill takes less than 2 seconds  No rash noted  He is not diaphoretic  Psychiatric: He has a normal mood and affect  Nursing note and vitals reviewed  ED Medications  Medications   diphenhydrAMINE (BENADRYL) injection 25 mg (25 mg Intravenous Given 3/19/18 1550)   ketorolac (TORADOL) injection 15 mg (15 mg Intravenous Given 3/19/18 1554)   sodium chloride 0 9 % bolus 1,000 mL (0 mL Intravenous Stopped 3/19/18 1626)   metoclopramide (REGLAN) injection 10 mg (10 mg Intravenous Given 3/19/18 1552)       Diagnostic Studies  Results Reviewed     None                 CT head without contrast   Final Result by Manda Abdullahi MD (03/19 1603)      No acute intracranial abnormality                    Workstation performed: [id number] Procedures  Procedures       Phone Contacts  ED Phone Contact    ED Course  ED Course as of Mar 19 2339   Mon Mar 19, 2018   1620 Patient reassessed, headache improved now 3/10 will d/c home                                MDM  Number of Diagnoses or Management Options  Headache: new and requires workup  Lipoma of chest wall: new and does not require workup  Diagnosis management comments:   23 yo M with headache x 1 day  He has a Normal neuro exam  Will obtain CT head given new onset headache and give migraine cocktail  Patient also c/o lump to right chest wall which he has believes has been there for a few months, it is mobile and non-tender  Bedside US performed with no apparent fluid collection or abscess, this is likely a lipoma and patient was given general surgery follow up and RTED instructions  Patients head CT was negative  Headache was relieved with migraine cocktail  I encouraged continued use of Motrin at home and follow-up information was given  Return to ED for worsening  Patient verbalizes understanding and agrees with the plan  Amount and/or Complexity of Data Reviewed  Tests in the radiology section of CPT®: ordered and reviewed  Independent visualization of images, tracings, or specimens: yes    Patient Progress  Patient progress: stable    CritCare Time    Disposition  Final diagnoses:   Headache   Lipoma of chest wall     Time reflects when diagnosis was documented in both MDM as applicable and the Disposition within this note     Time User Action Codes Description Comment    3/19/2018  4:21 PM Isela Grossman Add [R51] Headache     3/19/2018  4:22 PM Valentin Grossman Add [D17 39] Lipoma of chest wall       ED Disposition     ED Disposition Condition Comment    Discharge  Radene Hollow discharge to home/self care      Condition at discharge: Good        Follow-up Information     Follow up With Specialties Details Why Contact Info Additional Information    [facility] Schedule an appointment as soon as possible for a visit [address]  [facility] [address] [facility] [phone]  [place] [address] [facility] Emergency Department Emergency Medicine  If symptoms worsen - WORSENING HEADACHE, LUMP BECOMES RED, [address], [address]  [facility] ED, [address], [place], [place], [address]        Discharge Medication List as of 3/19/2018  4:23 PM      CONTINUE these medications which have NOT CHANGED    Details   butalbital-acetaminophen-caffeine (FIORICET) -40 mg per tablet Take 1 tablet by mouth every 4 (four) hours as needed for headaches for up to 20 doses  , Starting 5/21/2016, Until Discontinued, Print      naproxen (NAPROSYN) 500 mg tablet Take 1 tablet by mouth 2 (two) times a day as needed for mild pain, Starting Tue 10/10/2017, Print           No discharge procedures on file      ED Provider  Electronically Signed by           Inna Maciel PA-C  03/19/18 9983

## 2018-03-19 NOTE — ED NOTES
PT awake and alert, no distress noted, no other questions upon d/c     April M Wendy Sevilla, RN  03/19/18 8441

## 2018-03-28 ENCOUNTER — APPOINTMENT (EMERGENCY)
Dept: RADIOLOGY | Facility: HOSPITAL | Age: 28
End: 2018-03-28

## 2018-03-28 ENCOUNTER — HOSPITAL ENCOUNTER (EMERGENCY)
Facility: HOSPITAL | Age: 28
Discharge: HOME | End: 2018-03-28
Attending: EMERGENCY MEDICINE | Admitting: EMERGENCY MEDICINE

## 2018-03-28 VITALS
RESPIRATION RATE: 18 BRPM | SYSTOLIC BLOOD PRESSURE: 108 MMHG | BODY MASS INDEX: 26.08 KG/M2 | WEIGHT: 171.5 LBS | DIASTOLIC BLOOD PRESSURE: 61 MMHG | HEART RATE: 56 BPM | OXYGEN SATURATION: 100 % | TEMPERATURE: 97.7 F

## 2018-03-28 DIAGNOSIS — R07.9 CHEST PAIN, UNSPECIFIED TYPE: Primary | ICD-10-CM

## 2018-03-28 LAB
ALBUMIN SERPL BCP-MCNC: 3.8 G/DL (ref 3.5–5)
ALP SERPL-CCNC: 67 U/L (ref 46–116)
ALT SERPL W P-5'-P-CCNC: 13 U/L (ref 12–78)
ANION GAP SERPL CALCULATED.3IONS-SCNC: 9 MMOL/L (ref 4–13)
AST SERPL W P-5'-P-CCNC: 16 U/L (ref 5–45)
BASOPHILS # BLD AUTO: 0.01 THOUSANDS/ΜL (ref 0–0.1)
BASOPHILS NFR BLD AUTO: 0 % (ref 0–1)
BILIRUB SERPL-MCNC: 0.4 MG/DL (ref 0.2–1)
BUN SERPL-MCNC: 13 MG/DL (ref 5–25)
CALCIUM SERPL-MCNC: 8.6 MG/DL (ref 8.3–10.1)
CHLORIDE SERPL-SCNC: 105 MMOL/L (ref 100–108)
CO2 SERPL-SCNC: 28 MMOL/L (ref 21–32)
CREAT SERPL-MCNC: 0.96 MG/DL (ref 0.6–1.3)
EOSINOPHIL # BLD AUTO: 0.11 THOUSAND/ΜL (ref 0–0.61)
EOSINOPHIL NFR BLD AUTO: 2 % (ref 0–6)
ERYTHROCYTE [DISTWIDTH] IN BLOOD BY AUTOMATED COUNT: 12.7 % (ref 11.6–15.1)
GFR SERPL CREATININE-BSD FRML MDRD: 109 ML/MIN/1.73SQ M
GLUCOSE SERPL-MCNC: 80 MG/DL (ref 65–140)
HCT VFR BLD AUTO: 39.8 % (ref 36.5–49.3)
HGB BLD-MCNC: 13.3 G/DL (ref 12–17)
LYMPHOCYTES # BLD AUTO: 1.82 THOUSANDS/ΜL (ref 0.6–4.47)
LYMPHOCYTES NFR BLD AUTO: 40 % (ref 14–44)
MCH RBC QN AUTO: 31.4 PG (ref 26.8–34.3)
MCHC RBC AUTO-ENTMCNC: 33.4 G/DL (ref 31.4–37.4)
MCV RBC AUTO: 94 FL (ref 82–98)
MONOCYTES # BLD AUTO: 0.34 THOUSAND/ΜL (ref 0.17–1.22)
MONOCYTES NFR BLD AUTO: 8 % (ref 4–12)
NEUTROPHILS # BLD AUTO: 2.28 THOUSANDS/ΜL (ref 1.85–7.62)
NEUTS SEG NFR BLD AUTO: 50 % (ref 43–75)
PLATELET # BLD AUTO: 139 THOUSANDS/UL (ref 149–390)
PMV BLD AUTO: 11.1 FL (ref 8.9–12.7)
POTASSIUM SERPL-SCNC: 4 MMOL/L (ref 3.5–5.3)
PROT SERPL-MCNC: 6.3 G/DL (ref 6.4–8.2)
RBC # BLD AUTO: 4.23 MILLION/UL (ref 3.88–5.62)
SODIUM SERPL-SCNC: 142 MMOL/L (ref 136–145)
TROPONIN I SERPL-MCNC: <0.02 NG/ML
WBC # BLD AUTO: 4.56 THOUSAND/UL (ref 4.31–10.16)

## 2018-03-28 PROCEDURE — 36415 COLL VENOUS BLD VENIPUNCTURE: CPT | Performed by: PHYSICIAN ASSISTANT

## 2018-03-28 PROCEDURE — 80053 COMPREHEN METABOLIC PANEL: CPT | Performed by: PHYSICIAN ASSISTANT

## 2018-03-28 PROCEDURE — 93005 ELECTROCARDIOGRAM TRACING: CPT

## 2018-03-28 PROCEDURE — 71046 X-RAY EXAM CHEST 2 VIEWS: CPT

## 2018-03-28 PROCEDURE — 85025 COMPLETE CBC W/AUTO DIFF WBC: CPT | Performed by: PHYSICIAN ASSISTANT

## 2018-03-28 PROCEDURE — 99285 EMERGENCY DEPT VISIT HI MDM: CPT

## 2018-03-28 PROCEDURE — 84484 ASSAY OF TROPONIN QUANT: CPT | Performed by: PHYSICIAN ASSISTANT

## 2018-03-28 NOTE — ED NOTES
Discharge instructions reviewed with pt  Pt ambulated to waiting room by self-steady gait noted       Ilia Lm, RN  03/28/18 3421

## 2018-03-28 NOTE — DISCHARGE INSTRUCTIONS
Chest Pain   WHAT YOU NEED TO KNOW:   Chest pain can be caused by a range of conditions, from not serious to life-threatening  Chest pain can be a symptom of a digestive problem, such as acid reflux or a stomach ulcer  An anxiety attack or a strong emotion, such as anger, can also cause chest pain  Infection, inflammation, or a fracture in the bones or cartilage in your chest can cause pain or discomfort  Sometimes chest pain or pressure is caused by poor blood flow to your heart (angina)  Chest pain may also be caused by life-threatening conditions such as a heart attack or blood clot in your lungs  DISCHARGE INSTRUCTIONS:   Call 911 if:   · You have any of the following signs of a heart attack:      ¨ Squeezing, pressure, or pain in your chest that lasts longer than 5 minutes or returns    ¨ Discomfort or pain in your back, neck, jaw, stomach, or arm     ¨ Trouble breathing    ¨ Nausea or vomiting    ¨ Lightheadedness or a sudden cold sweat, especially with chest pain or trouble breathing    Return to the emergency department if:   · You have chest discomfort that gets worse, even with medicine  · You cough or vomit blood  · Your bowel movements are black or bloody  · You cannot stop vomiting, or it hurts to swallow  Contact your healthcare provider if:   · You have questions or concerns about your condition or care  Medicines:   · Medicines  may be given to treat the cause of your chest pain  Examples include pain medicine, anxiety medicine, or medicines to increase blood flow to your heart  · Do not take certain medicines without asking your healthcare provider first   These include NSAIDs, herbal or vitamin supplements, or hormones (estrogen or progestin)  · Take your medicine as directed  Contact your healthcare provider if you think your medicine is not helping or if you have side effects  Tell him or her if you are allergic to any medicine   Keep a list of the medicines, vitamins, and herbs you take  Include the amounts, and when and why you take them  Bring the list or the pill bottles to follow-up visits  Carry your medicine list with you in case of an emergency  Follow up with your healthcare provider within 72 hours, or as directed: You may need to return for more tests to find the cause of your chest pain  You may be referred to a specialist, such as a cardiologist or gastroenterologist  Write down your questions so you remember to ask them during your visits  Healthy living tips: The following are general healthy guidelines  If your chest pain is caused by a heart problem, your healthcare provider will give you specific guidelines to follow  · Do not smoke  Nicotine and other chemicals in cigarettes and cigars can cause lung and heart damage  Ask your healthcare provider for information if you currently smoke and need help to quit  E-cigarettes or smokeless tobacco still contain nicotine  Talk to your healthcare provider before you use these products  · Eat a variety of healthy, low-fat foods  Healthy foods include fruits, vegetables, whole-grain breads, low-fat dairy products, beans, lean meats, and fish  Ask for more information about a heart healthy diet  · Ask about activity  Your healthcare provider will tell you which activities to limit or avoid  Ask when you can drive, return to work, and have sex  Ask about the best exercise plan for you  · Maintain a healthy weight  Ask your healthcare provider how much you should weigh  Ask him or her to help you create a weight loss plan if you are overweight  · Get the flu and pneumonia vaccines  All adults should get the influenza (flu) vaccine  Get it every year as soon as it becomes available  The pneumococcal vaccine is given to adults aged 72 years or older  The vaccine is given every 5 years to prevent pneumococcal disease, such as pneumonia    © 2017 [employer] Information is for End User's use only and may not be sold, redistributed or otherwise used for commercial purposes  All illustrations and images included in CareNotes® are the copyrighted property of A [employer]  or [employer]  The above information is an  only  It is not intended as medical advice for individual conditions or treatments  Talk to your doctor, nurse or pharmacist before following any medical regimen to see if it is safe and effective for you

## 2018-03-28 NOTE — ED PROVIDER NOTES
History  Chief Complaint   Patient presents with    Chest Pain     Pt  reports left chest discomfort, started 0900  Pt  reports intermittant  23 yo M with no significant PMH who presents today for evaluation of chest pain that occurred at 0900  Patient states he was at work, ate two cheesesteaks and drank a soda and had gradual onset of chest pain which he describes at tightness and squeezing in central chest which radiated to left side of chest wall  The symptoms lasted a few minutes and then resolved  He has not had any additional episodes since  He states he was sent home from his job for this and walked to the ER  He denies SOB, dyspnea, headache, dizziness, lightheadedness, abd pain, n/v/d, paresthesias  He has no cardiac history or history of arrhthymias  No history of DVT or PE  He is a current daily smoker  No recent travel or recent surgery  None       History reviewed  No pertinent past medical history  History reviewed  No pertinent surgical history  History reviewed  No pertinent family history  I have reviewed and agree with the history as documented  Social History   Substance Use Topics    Smoking status: Current Every Day Smoker     Packs/day: 0 25     Types: Cigarettes    Smokeless tobacco: Never Used    Alcohol use No        Review of Systems   Constitutional: Negative for chills and fever  HENT: Negative for congestion, rhinorrhea and sore throat  Respiratory: Negative for cough, shortness of breath and wheezing  Cardiovascular: Positive for chest pain  Negative for palpitations and leg swelling  Gastrointestinal: Negative for abdominal pain, diarrhea, nausea and vomiting  Neurological: Negative for dizziness, syncope, weakness, light-headedness, numbness and headaches         Physical Exam  ED Triage Vitals   Temperature Pulse Respirations Blood Pressure SpO2   03/28/18 1343 03/28/18 1343 03/28/18 1343 03/28/18 1345 03/28/18 1343   97 7 °F (36 5 °C) 63 18 117/67 98 %      Temp Source Heart Rate Source Patient Position - Orthostatic VS BP Location FiO2 (%)   03/28/18 1343 03/28/18 1343 03/28/18 1343 03/28/18 1343 --   Oral Monitor Lying Right arm       Pain Score       03/28/18 1343       6           Orthostatic Vital Signs  Vitals:    03/28/18 1345 03/28/18 1425 03/28/18 1500 03/28/18 1530   BP: 117/67 106/64 104/61 108/61   Pulse: 58 56 (!) 54 56   Patient Position - Orthostatic VS:  Lying Lying Lying       Physical Exam   Constitutional: He is oriented to person, place, and time  He appears well-developed and well-nourished  Non-toxic appearance  He does not have a sickly appearance  He does not appear ill  No distress  Well appearing male, lying comfortably on exam table, no apparent distress   HENT:   Head: Normocephalic and atraumatic  Right Ear: External ear normal    Left Ear: External ear normal    Nose: Nose normal    Mouth/Throat: Oropharynx is clear and moist    Eyes: Conjunctivae and EOM are normal  Pupils are equal, round, and reactive to light  Neck: Normal range of motion  Neck supple  Cardiovascular: Normal rate, regular rhythm and normal heart sounds  Exam reveals no gallop and no friction rub  No murmur heard  Pulmonary/Chest: Effort normal and breath sounds normal  No respiratory distress  He has no wheezes  He has no rales  Musculoskeletal: Normal range of motion  Right lower leg: He exhibits no swelling and no edema  Left lower leg: He exhibits no swelling and no edema  Neurological: He is alert and oriented to person, place, and time  Skin: Skin is warm and dry  Capillary refill takes less than 2 seconds  No rash noted  He is not diaphoretic  Psychiatric: He has a normal mood and affect  Nursing note and vitals reviewed        ED Medications  Medications - No data to display    Diagnostic Studies  Results Reviewed     Procedure Component Value Units Date/Time    Comprehensive metabolic panel [[id number]] (Abnormal) Collected:  03/28/18 1420    Lab Status:  Final result Specimen:  Blood from Arm, Left Updated:  03/28/18 1506     Sodium 142 mmol/L      Potassium 4 0 mmol/L      Chloride 105 mmol/L      CO2 28 mmol/L      Anion Gap 9 mmol/L      BUN 13 mg/dL      Creatinine 0 96 mg/dL      Glucose 80 mg/dL      Calcium 8 6 mg/dL      AST 16 U/L      ALT 13 U/L      Alkaline Phosphatase 67 U/L      Total Protein 6 3 (L) g/dL      Albumin 3 8 g/dL      Total Bilirubin 0 40 mg/dL      eGFR 109 ml/min/1 73sq m     Narrative:         National Kidney Disease Education Program recommendations are as follows:  GFR calculation is accurate only with a steady state creatinine  Chronic Kidney disease less than 60 ml/min/1 73 sq  meters  Kidney failure less than 15 ml/min/1 73 sq  meters  Troponin I [[id number]]  (Normal) Collected:  03/28/18 1420    Lab Status:  Final result Specimen:  Blood from Arm, Left Updated:  03/28/18 1458     Troponin I <0 02 ng/mL     Narrative:         [employer] Chemistry analyzer 99% cutoff is > 0 04 ng/mL in network labs    o cTnI 99% cutoff is useful only when applied to patients in the clinical setting of myocardial ischemia  o cTnI 99% cutoff should be interpreted in the context of clinical history, ECG findings and possibly cardiac imaging to establish correct diagnosis  o cTnI 99% cutoff may be suggestive but clearly not indicative of a coronary event without the clinical setting of myocardial ischemia      CBC and differential [[id number]]  (Abnormal) Collected:  03/28/18 1420    Lab Status:  Final result Specimen:  Blood from Arm, Left Updated:  03/28/18 1432     WBC 4 56 Thousand/uL      RBC 4 23 Million/uL      Hemoglobin 13 3 g/dL      Hematocrit 39 8 %      MCV 94 fL      MCH 31 4 pg      MCHC 33 4 g/dL      RDW 12 7 %      MPV 11 1 fL      Platelets 905 (L) Thousands/uL      Neutrophils Relative 50 %      Lymphocytes Relative 40 %      Monocytes Relative 8 %      Eosinophils Relative 2 % Basophils Relative 0 %      Neutrophils Absolute 2 28 Thousands/µL      Lymphocytes Absolute 1 82 Thousands/µL      Monocytes Absolute 0 34 Thousand/µL      Eosinophils Absolute 0 11 Thousand/µL      Basophils Absolute 0 01 Thousands/µL                  XR chest 2 views   ED Interpretation by Demond Venegas PA-C (03/28 1424)   No acute abnormality      Final Result by Joselito Cosme MD (03/28 1530)      No acute cardiopulmonary disease  Workstation performed: [id number]                    Procedures  ECG 12 Lead Documentation  Date/Time: 3/28/2018 1:47 PM  Performed by: Ángel Meng  Authorized by: Ángel Meng     Indications / Diagnosis:  Chest pain  ECG reviewed by me, the ED Provider: yes (also ED attending Dr Alexandra Short)    Patient location:  ED and bedside  Previous ECG:     Previous ECG:  Compared to current    Comparison ECG info:  5/21/2016 - HR 63 bpm, NSR    Similarity:  No change    Comparison to cardiac monitor: Yes    Interpretation:     Interpretation: normal    Rate:     ECG rate:  57    ECG rate assessment: bradycardic    Rhythm:     Rhythm: sinus bradycardia    Ectopy:     Ectopy: none    QRS:     QRS axis:  Normal  Conduction:     Conduction: normal    ST segments:     ST segments:  Normal  T waves:     T waves: normal               Phone Contacts  ED Phone Contact    ED Course  ED Course as of Mar 28 1550   Wed Mar 28, 2018   1539 Patient was informed of imaging and CXR results  He has had no CP while in the ED  He has no complaints at this time            HEART Risk Score    Flowsheet Row Most Recent Value   History  0 Filed at: 03/28/2018 1540   ECG  0 Filed at: 03/28/2018 1540   Age  0 Filed at: 03/28/2018 1540   Risk Factors  1 Filed at: 03/28/2018 1540   Troponin  0 Filed at: 03/28/2018 1540   Heart Score Risk Calculator   History  0 Filed at: 03/28/2018 1540   ECG  0 Filed at: 03/28/2018 1540   Age  0 Filed at: 03/28/2018 1540   Risk Factors  1 Filed at: 03/28/2018 1540 Troponin  0 Filed at: 03/28/2018 1540   HEART Score  1 Filed at: 03/28/2018 1540   HEART Score  1 Filed at: 03/28/2018 1540            PERC Rule for PE    Flowsheet Row Most Recent Value   PERC Rule for PE   Age >=50  0 Filed at: 03/28/2018 1411   HR >=100  0 Filed at: 03/28/2018 1411   O2 Sat on room air < 95%  0 Filed at: 03/28/2018 1411   History of PE or DVT  0 Filed at: 03/28/2018 1411   Recent trauma or surgery  0 Filed at: 03/28/2018 1411   Hemoptysis  0 Filed at: 03/28/2018 1411   Exogenous estrogen  0 Filed at: 03/28/2018 1411   Unilateral leg swelling  0 Filed at: 03/28/2018 1411   PERC Rule for PE Results  0 Filed at: 03/28/2018 1411                Wells' Criteria for PE    Flowsheet Row Most Recent Value   Wells' Criteria for PE   Clinical signs and symptoms of DVT  0 Filed at: 03/28/2018 1411   PE is primary diagnosis or equally likely  0 Filed at: 03/28/2018 1411   HR >100  0 Filed at: 03/28/2018 1411   Immobilization at least 3 days or Surgery in the previous 4 weeks  0 Filed at: 03/28/2018 1411   Previous, objectively diagnosed PE or DVT  0 Filed at: 03/28/2018 1411   Hemoptysis  0 Filed at: 03/28/2018 1411   Malignancy with treatment within 6 months or palliative  0 Filed at: 03/28/2018 1411   Wells' Criteria Total  0 Filed at: 03/28/2018 1411            MDM  Number of Diagnoses or Management Options  Chest pain, unspecified type: new and requires workup  Diagnosis management comments: Ddx: ACS, GERD, PTX, anxiety, costochondritis,   25 yo M here for evaluation of chest pain  Had one episode of central and left sided chest pain for a few minutes at 0900 and it resolved  He has had no episodes since  He currently has no complaints while in the ED  His labwork was unremarkable  EKG: sinus bradycardia at 57 bpm  CXR with NAD  His heart score is 1  He is PERC negative  Patient was instructed to follow up with a PCP and RTED precautions were given  Patient verbalizes understanding and agrees with plan  Work note provided  Amount and/or Complexity of Data Reviewed  Clinical lab tests: ordered and reviewed  Tests in the radiology section of CPT®: ordered and reviewed  Independent visualization of images, tracings, or specimens: yes    Patient Progress  Patient progress: stable    CritCare Time    Disposition  Final diagnoses:   Chest pain, unspecified type     Time reflects when diagnosis was documented in both MDM as applicable and the Disposition within this note     Time User Action Codes Description Comment    3/28/2018  3:40 PM Vaibhav Grossman Add [R07 9] Chest pain, unspecified type       ED Disposition     ED Disposition Condition Comment    Discharge  Lela Trevino discharge to home/self care  Condition at discharge: Good        Follow-up Information     Follow up With Specialties Details Why Contact Info Additional Information    [facility]  Call TO ESTABLISH A PCP [address] [facility] Emergency Department Emergency Medicine  If symptoms worsen - chest pain, trouble breathing, abdominal pain [address] [id number],  [address], [place], [address]        There are no discharge medications for this patient  No discharge procedures on file      ED Provider  Electronically Signed by           Chris Wang PA-C  03/28/18 8431

## 2018-03-29 LAB
ATRIAL RATE: 86 BPM
P AXIS: 73 DEGREES
PR INTERVAL: 138 MS
QRS AXIS: 73 DEGREES
QRSD INTERVAL: 90 MS
QT INTERVAL: 392 MS
QTC INTERVAL: 381 MS
T WAVE AXIS: 70 DEGREES
VENTRICULAR RATE: 57 BPM

## 2018-03-29 PROCEDURE — 93010 ELECTROCARDIOGRAM REPORT: CPT | Performed by: INTERNAL MEDICINE

## 2018-04-03 ENCOUNTER — HOSPITAL ENCOUNTER (EMERGENCY)
Facility: HOSPITAL | Age: 28
Discharge: HOME | End: 2018-04-03
Admitting: EMERGENCY MEDICINE
Payer: COMMERCIAL

## 2018-04-03 VITALS
RESPIRATION RATE: 18 BRPM | SYSTOLIC BLOOD PRESSURE: 123 MMHG | DIASTOLIC BLOOD PRESSURE: 64 MMHG | TEMPERATURE: 97.9 F | OXYGEN SATURATION: 98 % | HEART RATE: 80 BPM

## 2018-04-03 DIAGNOSIS — S01.83XA PUNCTURE WOUND OF FACE, INITIAL ENCOUNTER: ICD-10-CM

## 2018-04-03 DIAGNOSIS — L70.0 CYSTIC ACNE: Primary | ICD-10-CM

## 2018-04-03 PROCEDURE — 90471 IMMUNIZATION ADMIN: CPT

## 2018-04-03 PROCEDURE — 90715 TDAP VACCINE 7 YRS/> IM: CPT | Performed by: NURSE PRACTITIONER

## 2018-04-03 PROCEDURE — 99282 EMERGENCY DEPT VISIT SF MDM: CPT

## 2018-04-03 RX ADMIN — TETANUS TOXOID, REDUCED DIPHTHERIA TOXOID AND ACELLULAR PERTUSSIS VACCINE, ADSORBED 0.5 ML: 5; 2.5; 8; 8; 2.5 SUSPENSION INTRAMUSCULAR at 14:49

## 2018-04-03 NOTE — DISCHARGE INSTRUCTIONS
Acne   WHAT YOU NEED TO KNOW:   Acne is a condition that causes red bumps, or pimples, to form on your skin  It is a long-term skin problem that is common in young adults  DISCHARGE INSTRUCTIONS:   Medicines: You may need any of the following:  · Topical treatments  are medicines that you put on your skin to kill germs, or to treat blackheads or whiteheads  Topicals may also reduce swelling or stop skin from peeling  They are available as gels, creams, pastes, liquids, and cleansers  · Antibiotics  may be given to treat an infection caused by bacteria  · Mild acids , such as salicylic or azelaic acid, help kill bacteria and improve your acne  · Hormone medicine  may help balance your hormone levels and reduce the amount of oil your pores make  · Retinoids  are prescription medicines to treat severe acne lesions  It is often used when other treatments do not work  You will need close follow-up if you take this medicine  Do not use this medicine if you are pregnant or breastfeeding  Retinoids may cause serious birth defects  Ask your healthcare provider for more information before you use this medicine  · Take your medicine as directed  Contact your healthcare provider if you think your medicine is not helping or if you have side effects  Tell him if you are allergic to any medicine  Keep a list of the medicines, vitamins, and herbs you take  Include the amounts, and when and why you take them  Bring the list or the pill bottles to follow-up visits  Carry your medicine list with you in case of an emergency  Use mild soap daily when you bathe: This will help control oiliness  Do not use harsh or drying soaps  Use oil-free lotion and sunscreen: This will help decrease irritation and keep your pores clear  Follow up with your healthcare provider as directed: You may need to return for regular blood tests  Write down your questions so you remember to ask them during your visits     Prevent acne: Use only the acne products that your healthcare provider recommends  Gels, solutions, cleansers, and medicated gauze pads may be used to reduce oily skin  Creams, ointments, and lotions are better if you have dry, sensitive skin  Continue to use these products as directed to prevent new acne  You may need to use your skin products less often if your skin gets irritated  You may need to stop using them until the irritation goes away  Contact your healthcare provider if:   · You have a fever and inflammation of your skin  · You are using retinoid medicine and you think you might be pregnant  · Your acne does not get better, even after treatment  · You have acne scars  · You begin to have mood swings or personality changes  · You have questions or concerns about your condition or care  © 2017 [address] is for End User's use only and may not be sold, redistributed or otherwise used for commercial purposes  All illustrations and images included in CareNotes® are the copyrighted property of [employer]  or [employer]  The above information is an  only  It is not intended as medical advice for individual conditions or treatments  Talk to your doctor, nurse or pharmacist before following any medical regimen to see if it is safe and effective for you  Puncture Wound   WHAT YOU NEED TO KNOW:   A puncture wound is a hole in the skin made by a sharp, pointed object  DISCHARGE INSTRUCTIONS:   Return to the emergency department if:   · You have severe pain  · You have numbness or tingling in the area of your wound  · Your wound starts bleeding and does not stop, even after you apply pressure  Contact your healthcare provider if:   · You have new drainage or a bad odor coming from the wound  · You have a fever  · You have increased swelling, redness, or pain  · You have red streaks on your skin coming from your wound      · You have questions or concerns about your condition or care  Medicines: You may need any of the following:  · NSAIDs , such as ibuprofen, help decrease swelling, pain, and fever  This medicine is available with or without a doctor's order  NSAIDs can cause stomach bleeding or kidney problems in certain people  If you take blood thinner medicine, always ask your healthcare provider if NSAIDs are safe for you  Always read the medicine label and follow directions  · Antibiotics  help treat a bacterial infection  · Take your medicine as directed  Contact your healthcare provider if you think your medicine is not helping or if you have side effects  Tell him of her if you are allergic to any medicine  Keep a list of the medicines, vitamins, and herbs you take  Include the amounts, and when and why you take them  Bring the list or the pill bottles to follow-up visits  Carry your medicine list with you in case of an emergency  Wound care:  Keep your wound clean and dry  When you are allowed to bathe, carefully wash the wound with soap and water  Dry the area and put on new, clean bandages as directed  Change your bandages when they get wet or dirty  Manage your symptoms:   · Rest  your injured area as much as possible  If the puncture wound is in your leg or foot, use crutches as directed  This will help keep the weight off your injured leg or foot as it heals  · Elevate  your injured area above the level of your heart as often as you can  This will help decrease swelling and pain  Prop your injured area on pillows or blankets to keep it elevated comfortably  Follow up with your healthcare provider in 2 to 3 days:  Write down your questions so you remember to ask them during your visits  © 2017 [address] Information is for End User's use only and may not be sold, redistributed or otherwise used for commercial purposes   All illustrations and images included in CareNotes® are the copyrighted property of [employer]  or [employer]  The above information is an  only  It is not intended as medical advice for individual conditions or treatments  Talk to your doctor, nurse or pharmacist before following any medical regimen to see if it is safe and effective for you
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
## 2018-08-24 ENCOUNTER — HOSPITAL ENCOUNTER (EMERGENCY)
Facility: HOSPITAL | Age: 28
Discharge: HOME | End: 2018-08-24
Attending: EMERGENCY MEDICINE

## 2018-08-24 VITALS
TEMPERATURE: 98.1 F | SYSTOLIC BLOOD PRESSURE: 105 MMHG | OXYGEN SATURATION: 97 % | BODY MASS INDEX: 25.14 KG/M2 | HEART RATE: 64 BPM | WEIGHT: 165.34 LBS | DIASTOLIC BLOOD PRESSURE: 66 MMHG | RESPIRATION RATE: 16 BRPM

## 2018-08-24 DIAGNOSIS — F19.10 DRUG ABUSE (HCC): Primary | ICD-10-CM

## 2018-08-24 LAB
AMPHETAMINES SERPL QL SCN: NEGATIVE
ANION GAP SERPL CALCULATED.3IONS-SCNC: 10 MMOL/L (ref 4–13)
APAP SERPL-MCNC: <2 UG/ML (ref 10–30)
BARBITURATES UR QL: NEGATIVE
BASOPHILS # BLD AUTO: 0.02 THOUSANDS/ΜL (ref 0–0.1)
BASOPHILS NFR BLD AUTO: 0 % (ref 0–1)
BENZODIAZ UR QL: NEGATIVE
BUN SERPL-MCNC: 12 MG/DL (ref 5–25)
CALCIUM SERPL-MCNC: 9.1 MG/DL (ref 8.3–10.1)
CHLORIDE SERPL-SCNC: 104 MMOL/L (ref 100–108)
CO2 SERPL-SCNC: 27 MMOL/L (ref 21–32)
COCAINE UR QL: NEGATIVE
CREAT SERPL-MCNC: 1.01 MG/DL (ref 0.6–1.3)
EOSINOPHIL # BLD AUTO: 0.05 THOUSAND/ΜL (ref 0–0.61)
EOSINOPHIL NFR BLD AUTO: 1 % (ref 0–6)
ERYTHROCYTE [DISTWIDTH] IN BLOOD BY AUTOMATED COUNT: 13.1 % (ref 11.6–15.1)
ETHANOL SERPL-MCNC: <3 MG/DL (ref 0–3)
GFR SERPL CREATININE-BSD FRML MDRD: 102 ML/MIN/1.73SQ M
GLUCOSE SERPL-MCNC: 83 MG/DL (ref 65–140)
HCT VFR BLD AUTO: 40.3 % (ref 36.5–49.3)
HGB BLD-MCNC: 14 G/DL (ref 12–17)
IMM GRANULOCYTES # BLD AUTO: 0.01 THOUSAND/UL (ref 0–0.2)
IMM GRANULOCYTES NFR BLD AUTO: 0 % (ref 0–2)
LYMPHOCYTES # BLD AUTO: 2.55 THOUSANDS/ΜL (ref 0.6–4.47)
LYMPHOCYTES NFR BLD AUTO: 38 % (ref 14–44)
MCH RBC QN AUTO: 32.7 PG (ref 26.8–34.3)
MCHC RBC AUTO-ENTMCNC: 34.7 G/DL (ref 31.4–37.4)
MCV RBC AUTO: 94 FL (ref 82–98)
METHADONE UR QL: NEGATIVE
MONOCYTES # BLD AUTO: 0.44 THOUSAND/ΜL (ref 0.17–1.22)
MONOCYTES NFR BLD AUTO: 7 % (ref 4–12)
NEUTROPHILS # BLD AUTO: 3.59 THOUSANDS/ΜL (ref 1.85–7.62)
NEUTS SEG NFR BLD AUTO: 54 % (ref 43–75)
NRBC BLD AUTO-RTO: 0 /100 WBCS
OPIATES UR QL SCN: NEGATIVE
PCP UR QL: NEGATIVE
PLATELET # BLD AUTO: 184 THOUSANDS/UL (ref 149–390)
PMV BLD AUTO: 10.6 FL (ref 8.9–12.7)
POTASSIUM SERPL-SCNC: 3.8 MMOL/L (ref 3.5–5.3)
RBC # BLD AUTO: 4.28 MILLION/UL (ref 3.88–5.62)
SALICYLATES SERPL-MCNC: 3.2 MG/DL (ref 3–20)
SODIUM SERPL-SCNC: 141 MMOL/L (ref 136–145)
THC UR QL: POSITIVE
WBC # BLD AUTO: 6.66 THOUSAND/UL (ref 4.31–10.16)

## 2018-08-24 PROCEDURE — 36415 COLL VENOUS BLD VENIPUNCTURE: CPT | Performed by: EMERGENCY MEDICINE

## 2018-08-24 PROCEDURE — 80329 ANALGESICS NON-OPIOID 1 OR 2: CPT | Performed by: EMERGENCY MEDICINE

## 2018-08-24 PROCEDURE — 85025 COMPLETE CBC W/AUTO DIFF WBC: CPT | Performed by: EMERGENCY MEDICINE

## 2018-08-24 PROCEDURE — 80048 BASIC METABOLIC PNL TOTAL CA: CPT | Performed by: EMERGENCY MEDICINE

## 2018-08-24 PROCEDURE — 99283 EMERGENCY DEPT VISIT LOW MDM: CPT

## 2018-08-24 PROCEDURE — 96360 HYDRATION IV INFUSION INIT: CPT

## 2018-08-24 PROCEDURE — 80320 DRUG SCREEN QUANTALCOHOLS: CPT | Performed by: EMERGENCY MEDICINE

## 2018-08-24 PROCEDURE — 80307 DRUG TEST PRSMV CHEM ANLYZR: CPT | Performed by: EMERGENCY MEDICINE

## 2018-08-24 RX ADMIN — SODIUM CHLORIDE 1000 ML: 0.9 INJECTION, SOLUTION INTRAVENOUS at 18:04

## 2018-08-24 NOTE — ED PROVIDER NOTES
History  Chief Complaint   Patient presents with    Recreational Drug Use     Pt  arrives with girlfriends mother, she states he took something that is now making him go in and out of consciousness and act inappropriatiely  Pt  came home around 5 pm acting this way  Pt  not sure what he took  History provided by:  Patient and significant other   used: No     61-year-old male brought in by girlfriend and her family for evaluation of unclear drug use  Patient reports that while at work his friend gave him some type of drug to drink  He does not recall the name  Notes that it was a liquid  States he has not had this before  He regularly uses marijuana but denies regularly using anything else  No alcohol today  He has been very drowsy an in and out of consciousness per girlfriend  He is awake and alert here but seems intoxicated  Pupils dilated, sluggishly reactive  Likely sedative/hypnotic  Patient denies hallucinations  No agitation  Plan labs, urine and will give fluids and re-evaluate  None       History reviewed  No pertinent past medical history  History reviewed  No pertinent surgical history  History reviewed  No pertinent family history  I have reviewed and agree with the history as documented  Social History   Substance Use Topics    Smoking status: Current Every Day Smoker     Packs/day: 0 25     Types: Cigarettes    Smokeless tobacco: Never Used    Alcohol use No        Review of Systems   Constitutional: Positive for fatigue  Negative for activity change and appetite change  Respiratory: Negative for shortness of breath  Cardiovascular: Negative for chest pain  Gastrointestinal: Negative for abdominal pain, nausea and vomiting  Musculoskeletal: Negative for back pain  Neurological: Negative for dizziness and weakness  Psychiatric/Behavioral: Positive for decreased concentration   Negative for agitation, confusion, hallucinations and suicidal ideas  All other systems reviewed and are negative  Physical Exam  Physical Exam   Constitutional: He appears well-developed and well-nourished  No distress  HENT:   Head: Normocephalic  Mouth/Throat: Oropharynx is clear and moist    Eyes:   Dilated, sluggishly reactive  Neck: Normal range of motion  Neck supple  Cardiovascular: Normal rate, regular rhythm and normal heart sounds  Pulmonary/Chest: Effort normal and breath sounds normal    Abdominal: Soft  He exhibits no distension  There is no tenderness  Musculoskeletal: Normal range of motion  He exhibits no edema  Neurological: He exhibits normal muscle tone  Awake and alert, seeming intoxicated  Skin: Skin is warm and dry  Psychiatric: He has a normal mood and affect  His behavior is normal    Nursing note and vitals reviewed        Vital Signs  ED Triage Vitals [08/24/18 1739]   Temperature Pulse Respirations Blood Pressure SpO2   98 1 °F (36 7 °C) 89 18 120/68 96 %      Temp Source Heart Rate Source Patient Position - Orthostatic VS BP Location FiO2 (%)   Oral Monitor Sitting Right arm --      Pain Score       No Pain           Vitals:    08/24/18 1739 08/24/18 1800 08/24/18 1830   BP: 120/68 99/58 105/66   Pulse: 89 68 64   Patient Position - Orthostatic VS: Sitting Lying Sitting       Visual Acuity  Visual Acuity      Most Recent Value   L Pupil Size (mm)  5   R Pupil Size (mm)  5          ED Medications  Medications   sodium chloride 0 9 % bolus 1,000 mL (0 mL Intravenous Stopped 8/24/18 1849)       Diagnostic Studies  Results Reviewed     Procedure Component Value Units Date/Time    Basic metabolic panel [[id number]] Collected:  08/24/18 1804    Lab Status:  Final result Specimen:  Blood from Arm, Right Updated:  08/24/18 1833     Sodium 141 mmol/L      Potassium 3 8 mmol/L      Chloride 104 mmol/L      CO2 27 mmol/L      Anion Gap 10 mmol/L      BUN 12 mg/dL      Creatinine 1 01 mg/dL      Glucose 83 mg/dL      Calcium 9 1 mg/dL      eGFR 102 ml/min/1 73sq m     Narrative:         National Kidney Disease Education Program recommendations are as follows:  GFR calculation is accurate only with a steady state creatinine  Chronic Kidney disease less than 60 ml/min/1 73 sq  meters  Kidney failure less than 15 ml/min/1 73 sq  meters  Salicylate level [[id number]]  (Normal) Collected:  08/24/18 1804    Lab Status:  Final result Specimen:  Blood from Arm, Right Updated:  39/43/07 8472     Salicylate Lvl 3 2 mg/dL     Acetaminophen level [[id number]]  (Abnormal) Collected:  08/24/18 1804    Lab Status:  Final result Specimen:  Blood from Arm, Right Updated:  08/24/18 1833     Acetaminophen Level <2 (L) ug/mL     Ethanol [[id number]]  (Normal) Collected:  08/24/18 1804    Lab Status:  Final result Specimen:  Blood from Arm, Right Updated:  08/24/18 1832     Ethanol Lvl <3 mg/dL     Rapid drug screen, urine [[id number]]  (Abnormal) Collected:  08/24/18 1808    Lab Status:  Final result Specimen:  Urine from Urine, Clean Catch Updated:  08/24/18 1828     Amph/Meth UR Negative     Barbiturate Ur Negative     Benzodiazepine Urine Negative     Cocaine Urine Negative     Methadone Urine Negative     Opiate Urine Negative     PCP Ur Negative     THC Urine Positive (A)    Narrative:         Presumptive report  If requested, specimen will be sent to reference lab for confirmation  FOR MEDICAL PURPOSES ONLY  IF CONFIRMATION NEEDED PLEASE CONTACT THE LAB WITHIN 5 DAYS      Drug Screen Cutoff Levels:  AMPHETAMINE/METHAMPHETAMINES  1000 ng/mL  BARBITURATES     200 ng/mL  BENZODIAZEPINES     200 ng/mL  COCAINE      300 ng/mL  METHADONE      300 ng/mL  OPIATES      300 ng/mL  PHENCYCLIDINE     25 ng/mL  THC       50 ng/mL    CBC and differential [[id number]] Collected:  08/24/18 1804    Lab Status:  Final result Specimen:  Blood from Arm, Right Updated:  08/24/18 1818     WBC 6 66 Thousand/uL      RBC 4 28 Million/uL      Hemoglobin 14 0 g/dL      Hematocrit 40 3 %      MCV 94 fL      MCH 32 7 pg      MCHC 34 7 g/dL      RDW 13 1 %      MPV 10 6 fL      Platelets 555 Thousands/uL      nRBC 0 /100 WBCs      Neutrophils Relative 54 %      Immat GRANS % 0 %      Lymphocytes Relative 38 %      Monocytes Relative 7 %      Eosinophils Relative 1 %      Basophils Relative 0 %      Neutrophils Absolute 3 59 Thousands/µL      Immature Grans Absolute 0 01 Thousand/uL      Lymphocytes Absolute 2 55 Thousands/µL      Monocytes Absolute 0 44 Thousand/µL      Eosinophils Absolute 0 05 Thousand/µL      Basophils Absolute 0 02 Thousands/µL                  No orders to display              Procedures  Procedures       Phone Contacts  ED Phone Contact    ED Course                               MDM  Number of Diagnoses or Management Options  Drug abuse:   Diagnosis management comments: 33-year-old male brought for evaluation of unknown drug ingestion  Had been given an unknown liquid by 1 of his friends and became drowsy, somewhat intoxicated  Symptoms improving in the ED  Awake and alert  Stable for discharge  Workup unremarkable  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  We discussed how this is very dangerous and also if he were driving under the influence it is a public health risk  Amount and/or Complexity of Data Reviewed  Clinical lab tests: ordered and reviewed  Obtain history from someone other than the patient: yes    Patient Progress  Patient progress: improved    CritCare Time    Disposition  Final diagnoses:   Drug abuse     Time reflects when diagnosis was documented in both MDM as applicable and the Disposition within this note     Time User Action Codes Description Comment    8/24/2018  6:41 PM Shyam Abernathy Add [F19 10] Drug abuse       ED Disposition     ED Disposition Condition Comment    Discharge  Ritu Butler discharge to home/self care      Condition at discharge: Stable        Follow-up Information     Follow up With Specialties Details Why Contact Info Additional Information    [facility] Emergency Department Emergency Medicine  If symptoms worsen [address]  [facility] ED, [address], [place], [place], [address]          There are no discharge medications for this patient  No discharge procedures on file      ED Provider  Electronically Signed by           Jose Angel Maki MD  08/24/18 9439

## 2018-08-24 NOTE — DISCHARGE INSTRUCTIONS
Using drugs, regardless of whether they are prescribed to somebody else or are off the street can cause harm to you and others especially if driving or using power equipment  Please return if any symptoms significantly worsen  Drink extra fluids

## 2018-10-11 ENCOUNTER — APPOINTMENT (EMERGENCY)
Dept: RADIOLOGY | Facility: HOSPITAL | Age: 28
End: 2018-10-11

## 2018-10-11 ENCOUNTER — HOSPITAL ENCOUNTER (EMERGENCY)
Facility: HOSPITAL | Age: 28
Discharge: HOME | End: 2018-10-11
Attending: EMERGENCY MEDICINE | Admitting: EMERGENCY MEDICINE

## 2018-10-11 VITALS
RESPIRATION RATE: 18 BRPM | WEIGHT: 166.45 LBS | OXYGEN SATURATION: 97 % | SYSTOLIC BLOOD PRESSURE: 123 MMHG | TEMPERATURE: 98.2 F | BODY MASS INDEX: 25.23 KG/M2 | DIASTOLIC BLOOD PRESSURE: 69 MMHG | HEIGHT: 68 IN | HEART RATE: 81 BPM

## 2018-10-11 DIAGNOSIS — J18.9 LINGULAR PNEUMONIA: Primary | ICD-10-CM

## 2018-10-11 PROCEDURE — 99283 EMERGENCY DEPT VISIT LOW MDM: CPT

## 2018-10-11 PROCEDURE — 71046 X-RAY EXAM CHEST 2 VIEWS: CPT

## 2018-10-11 RX ORDER — AZITHROMYCIN 250 MG/1
250 TABLET, FILM COATED ORAL DAILY
Qty: 4 TABLET | Refills: 0 | Status: SHIPPED | OUTPATIENT
Start: 2018-10-11 | End: 2018-10-16

## 2018-10-11 RX ORDER — AZITHROMYCIN 250 MG/1
500 TABLET, FILM COATED ORAL ONCE
Status: COMPLETED | OUTPATIENT
Start: 2018-10-11 | End: 2018-10-11

## 2018-10-11 RX ADMIN — AZITHROMYCIN 500 MG: 250 TABLET, FILM COATED ORAL at 09:04

## 2018-10-11 NOTE — DISCHARGE INSTRUCTIONS
Community Acquired Pneumonia   WHAT YOU NEED TO KNOW:   Community-acquired pneumonia (CAP) is a lung infection that you get outside of a hospital or nursing home setting  Your lungs become inflamed and cannot work well  CAP may be caused by bacteria, viruses, or fungi  DISCHARGE INSTRUCTIONS:   Return to the emergency department if:   · You are confused and cannot think clearly  · You have increased trouble breathing  · Your lips or fingernails turn gray or blue  Contact your healthcare provider if:   · Your symptoms do not get better, or they get worse  · You are urinating less, or not at all  · You have questions or concerns about your condition or care  Medicines:   · Medicines  may be given to treat a bacterial, viral, or fungal infection  You may also be given medicines to dilate your bronchial tubes to help you breathe more easily  · Take your medicine as directed  Contact your healthcare provider if you think your medicine is not helping or if you have side effects  Tell him or her if you are allergic to any medicine  Keep a list of the medicines, vitamins, and herbs you take  Include the amounts, and when and why you take them  Bring the list or the pill bottles to follow-up visits  Carry your medicine list with you in case of an emergency  Follow up with your healthcare provider within 3 days or as directed: You may need another x-ray  Write down your questions so you remember to ask them during your visits  Deep breathing and coughing:  Deep breathing helps open the air passages in your lungs  Coughing helps bring up mucus from your lungs  Take a deep breath and hold the breath as long as you can  Then push the air out of your lungs with a deep, strong cough  Spit out any mucus you have coughed up  Take 10 deep breaths in a row every hour that you are awake  Remember to follow each deep breath with a cough     Do not smoke or allow others to smoke around you:  Nicotine and other chemicals in cigarettes and cigars can cause lung damage  Ask your healthcare provider for information if you currently smoke and need help to quit  E-cigarettes or smokeless tobacco still contain nicotine  Talk to your healthcare provider before you use these products  Manage CAP at home:   · Breathe warm, moist air  This helps loosen mucus  Loosely place a warm, wet washcloth over your nose and mouth  A room humidifier may also help make the air moist     · Drink liquids as directed  Ask your healthcare provider how much liquid to drink each day and which liquids to drink  Liquids help make mucus thin and easier to get out of your body  · Gently tap your chest   This helps loosen mucus so it is easier to cough  Lie with your head lower than your chest several times a day and tap your chest      · Get plenty of rest   Rest helps your body heal   Prevent CAP:   · Wash your hands often with soap and water  Carry germ-killing hand gel with you  You can use the gel to clean your hands when soap and water are not available  Do not touch your eyes, nose, or mouth unless you have washed your hands first      · Clean surfaces often  Clean doorknobs, countertops, cell phones, and other surfaces that are touched often  · Always cover your mouth when you cough  Cough into a tissue or your shirtsleeve so you do not spread germs from your hands  · Try to avoid people who have a cold or the flu  If you are sick, stay away from others as much as possible  · Ask about vaccines  You may need a vaccine to help prevent pneumonia  Get an influenza (flu) vaccine every year as soon as it becomes available  © 2017 [address] [employer] Information is for End User's use only and may not be sold, redistributed or otherwise used for commercial purposes  All illustrations and images included in CareNotes® are the copyrighted property of [employer]  or [employer]    The above information is an  only  It is not intended as medical advice for individual conditions or treatments  Talk to your doctor, nurse or pharmacist before following any medical regimen to see if it is safe and effective for you

## 2018-10-11 NOTE — ED PROVIDER NOTES
History  Chief Complaint   Patient presents with    URI     Pt states cough and sore throat x 3 days  Productive of white mucous  History provided by:  Patient  URI   Presenting symptoms: congestion and cough    Presenting symptoms: no fever and no sore throat    Congestion:     Location:  Nasal and chest    Interferes with sleep: no      Interferes with eating/drinking: no    Cough:     Cough characteristics:  Productive    Sputum characteristics:  White    Severity:  Moderate    Onset quality:  Gradual    Duration:  4 days    Timing:  Constant    Progression:  Unchanged    Chronicity:  New  Severity:  Moderate  Onset quality:  Gradual  Duration:  4 days  Timing:  Intermittent  Progression:  Unchanged  Chronicity:  New  Relieved by:  None tried  Worsened by:  Nothing  Ineffective treatments:  None tried  Associated symptoms: no headaches, no neck pain and no wheezing    Associated symptoms comment:  Nasal congestion, sore throat , today after eating breakfast had 1 episode of vomiting  Risk factors comment:  Patient is daily cigarette smoker      None       History reviewed  No pertinent past medical history  History reviewed  No pertinent surgical history  History reviewed  No pertinent family history  I have reviewed and agree with the history as documented  Social History   Substance Use Topics    Smoking status: Current Every Day Smoker     Packs/day: 0 25     Types: Cigarettes    Smokeless tobacco: Never Used    Alcohol use No        Review of Systems   Constitutional: Negative for activity change, chills, diaphoresis and fever  HENT: Positive for congestion  Negative for sinus pressure and sore throat  Eyes: Negative for pain and visual disturbance  Respiratory: Positive for cough  Negative for chest tightness, shortness of breath, wheezing and stridor  Cardiovascular: Negative for chest pain and palpitations  Gastrointestinal: Positive for vomiting   Negative for abdominal distention, abdominal pain, constipation, diarrhea and nausea  Genitourinary: Negative for dysuria and frequency  Musculoskeletal: Negative for neck pain and neck stiffness  Skin: Negative for rash  Neurological: Negative for dizziness, speech difficulty, light-headedness, numbness and headaches  Physical Exam  Physical Exam   Constitutional: He is oriented to person, place, and time  He appears well-developed  No distress  HENT:   Head: Normocephalic and atraumatic  Eyes: Pupils are equal, round, and reactive to light  Neck: Normal range of motion  Neck supple  No tracheal deviation present  Cardiovascular: Normal rate, regular rhythm, normal heart sounds and intact distal pulses  No murmur heard  Pulmonary/Chest: Effort normal  No stridor  No respiratory distress  Diffuse coarse breath sounds, rhonchi bilateral upper lobes  Scattered wheezing although this cleared with patient coughing  But overall no distress no tachypnea   Abdominal: Soft  He exhibits no distension  There is no tenderness  There is no rebound and no guarding  Nontender to deep palpation all 4 quadrants  Nontender over McBurney's point  Musculoskeletal: Normal range of motion  Neurological: He is alert and oriented to person, place, and time  Skin: Skin is warm and dry  He is not diaphoretic  No erythema  No pallor  Psychiatric: He has a normal mood and affect  Vitals reviewed        Vital Signs  ED Triage Vitals   Temperature Pulse Respirations Blood Pressure SpO2   10/11/18 0819 10/11/18 0819 10/11/18 0819 10/11/18 0819 10/11/18 0819   98 2 °F (36 8 °C) 79 18 134/73 95 %      Temp src Heart Rate Source Patient Position - Orthostatic VS BP Location FiO2 (%)   -- 10/11/18 0854 10/11/18 0854 10/11/18 0854 --    Monitor Sitting Right arm       Pain Score       10/11/18 0819       No Pain           Vitals:    10/11/18 0819 10/11/18 0854   BP: 134/73 123/69   Pulse: 79 81   Patient Position - Orthostatic VS: Sitting       Visual Acuity      ED Medications  Medications   azithromycin (ZITHROMAX) tablet 500 mg (not administered)       Diagnostic Studies  Results Reviewed     None                 XR chest 2 views   ED Interpretation by Wagner Gonzalez DO (10/11 0846)   Questionable developing lingular infiltrate best appreciated on lateral image      Final Result by Rupali Holt MD (10/11 0858)      There is left lower lung patchy opacity which may represent a small infiltrate       Follow-up suggested in 6-8 weeks to demonstrate resolution      Workstation performed: [id number]                    Procedures  Procedures       Phone Contacts  ED Phone Contact    ED Course  ED Course as of Oct 11 0903   Thu Oct 11, 2018   0902 Radiologist read patient's x-ray, they are recommending repeat imaging and 6-8 weeks to ensure resolution  , this was explained to the patient at time of discharge  He agrees to follow with PCP for this  Although in the setting of acute illness I do believe this more likely represents pneumonia                                MDM  Number of Diagnoses or Management Options  Lingular pneumonia: new and requires workup  Diagnosis management comments:       Initial ED assessment:  70-year-old male, presents with cough, feeling generally ill 1 episode of vomiting    Initial DDx includes but is not limited to:   Pneumonia, viral URI  Doubt intra-abdominal pathology due to nontender abdomen    Initial ED plan:   Chest x-ray  , disposition pending x-ray results  , if viral will treat with cough suppressant, if showing infiltrate will treat as bacterial pneumonia as an outpatient        Final ED summary/disposition:   After evaluation and workup in the emergency department, x-ray concerning for an early lingular infiltrate, will discharge with azithromycin        Amount and/or Complexity of Data Reviewed  Tests in the radiology section of CPT®: ordered and reviewed  Review and summarize past medical records: yes  Independent visualization of images, tracings, or specimens: yes      CritCare Time    Disposition  Final diagnoses:   Lingular pneumonia     Time reflects when diagnosis was documented in both MDM as applicable and the Disposition within this note     Time User Action Codes Description Comment    10/11/2018  8:46 AM Brad Lo [J18 9] Lingular pneumonia       ED Disposition     ED Disposition Condition Comment    Discharge  Derrick Deem discharge to home/self care  Condition at discharge: Good        Follow-up Information    None         Patient's Medications   Discharge Prescriptions    AZITHROMYCIN (ZITHROMAX) 250 MG TABLET    Take 1 tablet (250 mg total) by mouth daily for 5 days       Start Date: 10/11/2018End Date: 10/16/2018       Order Dose: 250 mg       Quantity: 4 tablet    Refills: 0     No discharge procedures on file      ED Provider  Electronically Signed by           Jamaal Garner DO  10/11/18 [address] [place] [address],   10/11/18 1342

## 2019-06-03 NOTE — ED NOTES
Pt laying on stretcher watching TV in negative distress  Sinus bradycardia on monitor  Assessment unchanged  Pt continues to deny CP, SOB, or any complaints at present time  VS  Will continue to monitor       Trav Duffy RN  03/28/18 1534 Stan Quintero notified that MRI of spine cannot be done.

## 2019-11-25 ENCOUNTER — HOSPITAL ENCOUNTER (EMERGENCY)
Facility: HOSPITAL | Age: 29
Discharge: HOME | End: 2019-11-25
Attending: EMERGENCY MEDICINE

## 2019-11-25 VITALS
HEART RATE: 65 BPM | BODY MASS INDEX: 23.75 KG/M2 | WEIGHT: 156.2 LBS | SYSTOLIC BLOOD PRESSURE: 114 MMHG | RESPIRATION RATE: 16 BRPM | TEMPERATURE: 98.4 F | DIASTOLIC BLOOD PRESSURE: 56 MMHG | OXYGEN SATURATION: 99 %

## 2019-11-25 DIAGNOSIS — L60.0 INGROWN TOENAIL: Primary | ICD-10-CM

## 2019-11-25 PROCEDURE — 99283 EMERGENCY DEPT VISIT LOW MDM: CPT

## 2019-11-25 PROCEDURE — 99283 EMERGENCY DEPT VISIT LOW MDM: CPT | Performed by: PHYSICIAN ASSISTANT

## 2019-11-25 RX ORDER — CEPHALEXIN 250 MG/1
250 CAPSULE ORAL EVERY 6 HOURS SCHEDULED
Qty: 20 CAPSULE | Refills: 0 | Status: SHIPPED | OUTPATIENT
Start: 2019-11-25 | End: 2019-11-30

## 2019-11-25 RX ORDER — SULFAMETHOXAZOLE AND TRIMETHOPRIM 800; 160 MG/1; MG/1
1 TABLET ORAL 2 TIMES DAILY
Qty: 10 TABLET | Refills: 0 | Status: SHIPPED | OUTPATIENT
Start: 2019-11-25 | End: 2019-11-30

## 2019-11-25 NOTE — DISCHARGE INSTRUCTIONS
So cared toe in warm water for 20 minutes 3 times a day  Please call make an appointment with Podiatry for follow-up for removal of your medial nail

## 2019-11-25 NOTE — ED PROVIDER NOTES
History  Chief Complaint   Patient presents with    Toe Pain     Pt started with L great toe pain this am  Pt states he noticed some redness and swelling onthe left side of the nail bed  Denies fevers  History provided by:  Patient  Toe Pain   Location:  Left great toe  Severity:  Mild  Onset quality:  Gradual  Duration:  1 day  Timing:  Constant  Progression:  Waxing and waning  Chronicity:  New  Context:  Patient presents emergency room with complaints of ingrown toenail on his left great toe  He denies any fever chills  He denies any injury  He trim the nail and it is feeling better  He did notice some pus under the nail when he trimmed back  Relieved by:  Rest  Worsened by:  Palpation and ambulation  Associated symptoms: no fever        None       History reviewed  No pertinent past medical history  History reviewed  No pertinent surgical history  History reviewed  No pertinent family history  I have reviewed and agree with the history as documented  Social History     Tobacco Use    Smoking status: Current Every Day Smoker     Packs/day: 0 25     Types: Cigarettes    Smokeless tobacco: Never Used   Substance Use Topics    Alcohol use: No    Drug use: Yes     Types: Marijuana        Review of Systems   Constitutional: Negative for activity change, chills and fever  Skin: Positive for color change  All other systems reviewed and are negative  Physical Exam  Physical Exam   Constitutional: He is oriented to person, place, and time  He appears well-developed and well-nourished  No distress  HENT:   Head: Normocephalic  Right Ear: External ear normal    Left Ear: External ear normal    Nose: Nose normal    Pulmonary/Chest: Effort normal    Neurological: He is alert and oriented to person, place, and time  Skin: Capillary refill takes less than 2 seconds  He is not diaphoretic  There is erythema     Examination of the left nail-there is mild erythema present over the medial aspect of the nail  There is no subungual abscess or active drainage present  The area is tender upon palpation  Psychiatric: He has a normal mood and affect  His behavior is normal  Judgment and thought content normal    Nursing note and vitals reviewed  Vital Signs  ED Triage Vitals [11/25/19 1138]   Temperature Pulse Respirations Blood Pressure SpO2   98 4 °F (36 9 °C) 65 16 114/56 99 %      Temp Source Heart Rate Source Patient Position - Orthostatic VS BP Location FiO2 (%)   Oral Monitor Sitting Left arm --      Pain Score       6           Vitals:    11/25/19 1138   BP: 114/56   Pulse: 65   Patient Position - Orthostatic VS: Sitting         Visual Acuity      ED Medications  Medications - No data to display    Diagnostic Studies  Results Reviewed     None                 No orders to display              Procedures  Procedures       ED Course                               MDM  Number of Diagnoses or Management Options  Ingrown toenail: new and does not require workup  Risk of Complications, Morbidity, and/or Mortality  Presenting problems: low  Diagnostic procedures: low  Management options: low  General comments: Patient presents emergency room with complaints of ingrown toenail on his left great toe  He has associated erythema  He was seen and examined  He was diagnosed with an ingrown toenail  He was given a Podiatry referral   He was placed on Keflex and Bactrim for 5 days  He will soak his toe in warm water for 20 minutes 3 times a day  He will return to the emergency room if his symptoms worsen  He will call make a follow-up appointment with Podiatry      Patient Progress  Patient progress: stable      Disposition  Final diagnoses:   Ingrown toenail - Acute infected left ingrown toenail     Time reflects when diagnosis was documented in both MDM as applicable and the Disposition within this note     Time User Action Codes Description Comment    11/25/2019 11:47 AM Mag Staples Add [L60 0] Ingrown toenail     11/25/2019 11:47 AM Gutzweiler, Aleda England Modify [L60 0] Ingrown toenail Acute left    11/25/2019 11:48 AM Gutzweiler, Aleda England Modify [L60 0] Ingrown toenail Acute infected left ingrown toenail      ED Disposition     ED Disposition Condition Date/Time Comment    Discharge Stable Mon Nov 25, 2019 11:47 AM Alan Masters discharge to home/self care  Follow-up Information     Follow up With Specialties Details Why Contact Info    Charla Gatica DPM Podiatry Schedule an appointment as soon as possible for a visit   [address] [place] [address]  [phone]            Discharge Medication List as of 11/25/2019 11:50 AM      START taking these medications    Details   cephalexin (KEFLEX) 250 mg capsule Take 1 capsule (250 mg total) by mouth every 6 (six) hours for 5 days, Starting Mon 11/25/2019, Until Sat 11/30/2019, Normal      sulfamethoxazole-trimethoprim (BACTRIM DS) 800-160 mg per tablet Take 1 tablet by mouth 2 (two) times a day for 5 days smx-tmp DS (BACTRIM) 800-160 mg tabs (1tab q12 D10), Starting Mon 11/25/2019, Until Sat 11/30/2019, Normal           No discharge procedures on file      ED Provider  Electronically Signed by           Carmita oMss PA-C  11/25/19 2000

## 2020-08-20 ENCOUNTER — HOSPITAL ENCOUNTER (EMERGENCY)
Facility: HOSPITAL | Age: 30
Discharge: HOME | End: 2020-08-20
Admitting: EMERGENCY MEDICINE
Payer: COMMERCIAL

## 2020-08-20 ENCOUNTER — APPOINTMENT (EMERGENCY)
Dept: RADIOLOGY | Facility: HOSPITAL | Age: 30
End: 2020-08-20
Payer: COMMERCIAL

## 2020-08-20 VITALS
HEART RATE: 99 BPM | OXYGEN SATURATION: 99 % | WEIGHT: 163.6 LBS | BODY MASS INDEX: 24.88 KG/M2 | RESPIRATION RATE: 18 BRPM | TEMPERATURE: 98.5 F | DIASTOLIC BLOOD PRESSURE: 86 MMHG | SYSTOLIC BLOOD PRESSURE: 132 MMHG

## 2020-08-20 DIAGNOSIS — S22.31XA FRACTURE OF ONE RIB, RIGHT SIDE, INITIAL ENCOUNTER FOR CLOSED FRACTURE: Primary | ICD-10-CM

## 2020-08-20 PROCEDURE — 99283 EMERGENCY DEPT VISIT LOW MDM: CPT

## 2020-08-20 PROCEDURE — 99284 EMERGENCY DEPT VISIT MOD MDM: CPT | Performed by: PHYSICIAN ASSISTANT

## 2020-08-20 PROCEDURE — 71101 X-RAY EXAM UNILAT RIBS/CHEST: CPT

## 2020-08-20 RX ORDER — MORPHINE SULFATE 15 MG/1
7.5 TABLET ORAL EVERY 6 HOURS PRN
Qty: 6 TABLET | Refills: 0 | Status: SHIPPED | OUTPATIENT
Start: 2020-08-20 | End: 2020-08-23

## 2020-08-20 RX ORDER — IBUPROFEN 600 MG/1
600 TABLET ORAL EVERY 6 HOURS PRN
Qty: 30 TABLET | Refills: 0 | Status: SHIPPED | OUTPATIENT
Start: 2020-08-20 | End: 2020-08-30
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
## 2020-08-21 NOTE — ED NOTES
Provided inceptive spirometer and educated on use, adequate return demonstration, acknowledged understanding on how and when to use device        Mesha Hanks RN  08/20/20 2029